# Patient Record
Sex: MALE | Race: BLACK OR AFRICAN AMERICAN | NOT HISPANIC OR LATINO | Employment: UNEMPLOYED | URBAN - METROPOLITAN AREA
[De-identification: names, ages, dates, MRNs, and addresses within clinical notes are randomized per-mention and may not be internally consistent; named-entity substitution may affect disease eponyms.]

---

## 2020-04-09 ENCOUNTER — NURSE TRIAGE (OUTPATIENT)
Dept: ADMINISTRATIVE | Facility: CLINIC | Age: 45
End: 2020-04-09

## 2020-04-09 NOTE — TELEPHONE ENCOUNTER
Patient contacted covid-19 hotline for concern of covid-19 exposure.  Patient's spouse was confirmed covid-19 + yesterday.  Patient's spouse is a nurse.  Patient c/o fatigue, headaches, sore throat and joint pain.  Patient states that fever is not present any more. Disposition: Call Telemedicine Provider. Anywhere care instructions given.    Reason for Disposition   [1] Fever (or feeling feverish) OR cough AND [2] within 14 Days of COVID-19 EXPOSURE (Close Contact)    Additional Information   Negative: SEVERE difficulty breathing (e.g., struggling for each breath, speak in single words, bluish lips)   Negative: Sounds like a life-threatening emergency to the triager   Negative: [1] Adult has symptoms of COVID-19 (fever, cough, or SOB) AND [2] lab test positive   Negative: [1] Adult has symptoms of COVID-19 (fever, cough or SOB) AND [2] major community spread where patient lives AND [3] testing not being done for mild symptoms   Negative: [1] Difficulty breathing (shortness of breath) occurs AND [2] onset > 14 days after COVID-19 EXPOSURE (Close Contact) AND [3] no major community spread   Negative: [1] Cough occurs AND [2] onset > 14 days after COVID-19 EXPOSURE AND [3] no major community spread   Negative: [1] Common cold symptoms AND [2] onset > 14 days after COVID-19 EXPOSURE AND [3] no major community spread   Negative: Patient sounds very sick or weak to the triager   Negative: [1] Difficulty breathing occurs AND [2] within 14 days of COVID-19 EXPOSURE (Close Contact)    Protocols used: CORONAVIRUS (COVID-19) EXPOSURE-A-[place]

## 2020-12-04 ENCOUNTER — CLINICAL SUPPORT (OUTPATIENT)
Dept: REHABILITATION | Facility: HOSPITAL | Age: 45
End: 2020-12-04
Payer: MEDICAID

## 2020-12-04 DIAGNOSIS — R26.89 FUNCTIONAL GAIT ABNORMALITY: ICD-10-CM

## 2020-12-04 DIAGNOSIS — M25.651 DECREASED RANGE OF MOTION OF BOTH HIPS: ICD-10-CM

## 2020-12-04 DIAGNOSIS — R29.898 WEAKNESS OF BOTH HIPS: ICD-10-CM

## 2020-12-04 DIAGNOSIS — M25.652 DECREASED RANGE OF MOTION OF BOTH HIPS: ICD-10-CM

## 2020-12-04 DIAGNOSIS — M25.552 LEFT HIP PAIN: Primary | ICD-10-CM

## 2020-12-04 PROCEDURE — 97161 PT EVAL LOW COMPLEX 20 MIN: CPT | Mod: PN

## 2020-12-04 PROCEDURE — 97110 THERAPEUTIC EXERCISES: CPT | Mod: PN

## 2020-12-07 ENCOUNTER — CLINICAL SUPPORT (OUTPATIENT)
Dept: REHABILITATION | Facility: HOSPITAL | Age: 45
End: 2020-12-07
Payer: MEDICAID

## 2020-12-07 DIAGNOSIS — R29.898 WEAKNESS OF BOTH HIPS: ICD-10-CM

## 2020-12-07 DIAGNOSIS — M25.652 DECREASED RANGE OF MOTION OF BOTH HIPS: ICD-10-CM

## 2020-12-07 DIAGNOSIS — M25.651 DECREASED RANGE OF MOTION OF BOTH HIPS: ICD-10-CM

## 2020-12-07 PROBLEM — M25.552 LEFT HIP PAIN: Status: ACTIVE | Noted: 2020-12-07

## 2020-12-07 PROBLEM — R26.89 FUNCTIONAL GAIT ABNORMALITY: Status: ACTIVE | Noted: 2020-12-07

## 2020-12-07 PROCEDURE — 97110 THERAPEUTIC EXERCISES: CPT | Mod: PN

## 2020-12-07 NOTE — PROGRESS NOTES
"  Physical Therapy Treatment Note     Name: Enid Beauchamp [facility] Number: [id number]    Therapy Diagnosis:   Encounter Diagnoses   Name Primary?    Decreased range of motion of both hips     Weakness of both hips      Physician: Kenneth Melendez MD    Visit Date: 12/7/2020    Physician Orders: PT Eval and Treat   Medical Diagnosis from Referral: M25.552 (ICD-10-CM) - Left hip pain  Evaluation Date: 12/4/2020  Authorization Period Expiration: 2/4/2021  Plan of Care Expiration: 3/4/2021  Visit # / Visits authorized: 2/ 12    Time In: 1335  Time Out: 1420  Total Billable Time: 45 minutes    Precautions: Standard    Subjective     Pt reports: No significant change in symptoms since previous session. Has used HEP stretches with good results.     He was compliant with home exercise program.  Response to previous treatment: No adverse reactions noted   Functional change: Ongoing     Pain: 0/10  Location: left hip       Objective     ENID received therapeutic exercises to develop strength, endurance, flexibility and posture for 45 minutes including:    Upright bike Lvl 1 x3' Lvl 2 x3'  Lateral steps RTB 10x10' laps   Standing HS stretch 3x30" ea   SLR 2x10 ea   SL ABD 2x10 ea   Prone ext - knee straight, knee bent 2x10 ea   SLS star steps RTB 3-way 2x5 ea     NV Consider adding: Quadruped rocking, Stool rotations IR/ER, BKFO, clams, fire hydrants, squats, bridges, planks, bird dogs, pallof press, step ups, lateral step downs, lunges,       Home Exercises Provided and Patient Education Provided     Education provided:   - Rationale for POC and progression   - Updated HEP, intended use, proper form     Written Home Exercises Provided: yes.  Exercises were reviewed and ENID was able to demonstrate them prior to the end of the session.  ENID demonstrated good  understanding of the education provided.     See EMR under Patient Instructions for exercises provided 12/7/2020.    Assessment     Enid was seen today for first " follow up since evaluation. He performed well, tolerating all new hip exercises without exacerbation of symptoms. HEP updated to include hip strengthening circuit. Good form demonstrated with all exercises.   Adequate level of fatigue achieved at conclusion of session.     CATINA is progressing well towards his goals.   Pt prognosis is Good.     Pt will continue to benefit from skilled outpatient physical therapy to address the deficits listed in the problem list box on initial evaluation, provide pt/family education and to maximize pt's level of independence in the home and community environment.     Pt's spiritual, cultural and educational needs considered and pt agreeable to plan of care and goals.     Anticipated barriers to physical therapy: None     Goals:   1. Pt to improve hip strength to 5/5 for improved ability to exercise and play with son. -progressing   2. Pt to improve hip range of motion by >/= 3 degrees to allow for improved mobility while exercising. -progressing   3. Pt to demonstrate understanding of pathology and use of HEP for improved self-management of symptoms. -progressing   4. Pt to report decreased pain to 5/10 at worst for increased participation in social/community activities. -progressing   5. Pt to increased FOTO performance to 37% limitation for improved participation. -progressing   6. Pt to sleep 6+ hours 4/7 nights for decreased fatigue and improved participation in daily activities.-progressing      Long Term Goals (10 Weeks):      1. Pt to demonstrate (-) SLS testing bilaterally and (-) bridge testing, indicating improvement in functional glute strength.-progressing   2. Pt to improve hip IR range of motion to by >/= 10 degrees to allow for improved mobility while exercising.  -progressing   3. Pt to demonstrate proficiency with HEP for improved independence with self-management of condition/symptoms and prevention of re-injury. -progressing   4. Pt to report decreased pain to 3/10  at worst for increased participation in social/community activities. -progressing   5. Pt to increased FOTO performance to 28% limitation for improved participation. -progressing   6. Pt to sleep 6+ hours 6/7 nights in bed for decreased fatigue and improved participation in daily activities.-progressing     Plan     Cont with POC, progressing with flexibility training and hip strengthening and stabilization as tolerated.     NV Consider adding: Quadruped rocking, Stool rotations IR/ER, BKFO, clams, fire hydrants, squats, bridges, planks, bird dogs, pallof press, step ups, lateral step downs, lunges,     NADER REGAN, PT   12/7/2020

## 2020-12-07 NOTE — PLAN OF CARE
[facility]  Physical Therapy Initial Evaluation    Date: 12/4/2020   Name: Enid Barreto  Clinic Number: [id number]    Therapy Diagnosis:   Encounter Diagnoses   Name Primary?    Decreased range of motion of both hips     Left hip pain Yes    Weakness of both hips     Functional gait abnormality      Physician: Kneneth Melendez MD    Physician Orders: PT Eval and Treat   Medical Diagnosis from Referral: M25.552 (ICD-10-CM) - Left hip pain  Evaluation Date: 12/4/2020  Authorization Period Expiration: 2/4/2021  Plan of Care Expiration: 3/4/2021  Visit # / Visits authorized: 1/ 12    Time In: 1200  Time Out: 1250  Total Appointment Time (timed & untimed codes): 50 minutes    Precautions: Standard    Subjective   Date of onset:  April 2020    History of current condition - ENID reports: In April, he began noticing slight pain and limited ROM at L hip while exercising. ROM most limited with external rotation. Pain began worsening and started waking hip from sleep when he changes position. Pain remains at anterior hip, though he has also experienced occasional groin pain on same side. He has been unable to identify a specific TALHA, though he believes it could have started when he was playing catch with his son. He frequently goes up onto toes or leaps to catch a stray ball and lands on rotated hip. He still does this now, and experiences significant pain upon impact. He has not received any treatment for this pain until now; no imaging performed. He would like to find out and fix whatever is going on before it worsens.      Medical History:   Past Medical History:   Diagnosis Date    Hypertension        Surgical History:   Enid Barreto  has no past surgical history on file.    Medications:   Enid has a current medication list which includes the following prescription(s): naproxen.    Allergies:   Review of patient's allergies indicates:  No Known Allergies     Imaging,  none    Prior Therapy: No   Social History:  lives with their family  Occupation: Not currently working   Prior Level of Function: Independent, excising at gym/park regularly  Current Level of Function: Wakes from sleeping, unable to exercise fully due to pain, unable to play with son without increased pain     Pain:  Current 0/10, worst 8/10, best 0/10   Location: left hip    Description: Grabbing, Tight and Sharp  Aggravating Factors: Walking, Getting out of bed/chair and pivoting, changes positioning in sitting/laying down  Easing Factors: nothing    Patients goals: return to normal activity without fear of movement     Objective     Observation: alert and agreeable, L trendelenberg     Posture: PPT, hypolordosis, rounded shoulders, forward head     Hip Range of Motion:   Right active Right Passive Left active  Left Passive *    Flexion 123 130 118 124   Abduction 40 42 35 38   Extension NP  NP NP  NP    Ext. Rotation 38 40 38 40   Int. Rotation 22 23 19 20       Lower Extremity Strength  Right LE  Left LE    Knee extension: 5/5 Knee extension: 5/5   Knee flexion: 5/5 Knee flexion: 5/5   Hip flexion: 5/5 Hip flexion: 5/5   Hip Internal Rotation:  5/5    Hip Internal Rotation: 4+/5      Hip External Rotation: 5/5    Hip External Rotation: 4+/5      Hip extension:  5/5 Hip extension: 5/5   Hip abduction: 5/5 Hip abduction: 5/5   Hip adduction: 4+/5 Hip adduction 4+/5   Ankle dorsiflexion: 5/5 Ankle dorsiflexion: 5/5   Ankle plantarflexion: 5/5 Ankle plantarflexion: 5/5       Special Tests:  Bridge Test: Instability noted   MARK: (+) on L   FADIR: (-)   Scour: (+)  SLR: (-)     Flexibility:    Ely's test: R = WNL  ; L = minor limitation    Popliteal Angle: R = moderate limitation ; L = moderate limitation   Olivier's test: R = WNL ; L = minor limitation    Gilbert test: R = WNL ; L = minor limiation    Joint Mobility: Pain and hypomobility with posterior glide on L     Palpation: TTP at GT with hip flexion      Sensation: No sensory changes noted     Edema: None noted       Limitation/Restriction for FOTO Hip  Survey    Therapist reviewed FOTO scores for Enid Barreto on 12/4/2020.   FOTO documents entered into [employer] - see Media section.    Limitation Score: 46%         TREATMENT   Treatment Time In: 1230  Treatment Time Out: 1250  Total Treatment time (time-based codes) separate from Evaluation: 20 minutes    ENID received therapeutic exercises to develop flexibility, posture for 15 minutes including:    B piriformis stretch   L hip flexor stretch on step   B HS stretch   Standing L ITB stretch     ENID received the following manual therapy techniques: Joint mobilizations were applied to the: L hip  for 5 minutes, including:    Posterior glide G I/II for pain managemen  Home Exercises and Patient Education Provided    Education provided:   - role of PT, plan of care, involved anatomy and pathology,  goals, rational for treatment and exercise, scheduling limitations  - HEP, intended use, proper form     Written Home Exercises Provided: yes.  Exercises were reviewed and ENID was able to demonstrate them prior to the end of the session.  ENID demonstrated good  understanding of the education provided.     See EMR under Patient Instructions for exercises provided 12/4/2020.    Assessment   Enid is a 45 y.o. male referred to outpatient Physical Therapy with a medical diagnosis of L hip pain. Patient presents with anterior hip pain, provoked by hip flexion, ER, MARK, and Scour. Pain not noted with FADIR initially, but provoked with repeated motions. Tenderness also noted at GT with palpation, though Enid denies posterior pain. No gross LE strength deficits noted; functional testing revealed glute instability and B trendelenberg, though worse on L. Slight limitations also noted in B hip ROM, especially with IR. Limited flexibility noted at B hamstrings and piriformis, as well as L ITB and hip flexors. Due to  presentation and TALHA, hip impingement and labral involvement possible. Neid would benefit from strengthening of surrounding musculature for improved stability as well as flexibility training and postural re-education for improved alignment and mobility.     Patient prognosis is Good.   Patientt will benefit from skilled outpatient Physical Therapy to address the deficits stated above and in the chart below, provide patient /family education, and to maximize patientt's level of independence.     Plan of care discussed with patient: Yes  Patient's spiritual, cultural and educational needs considered and patient is agreeable to the plan of care and goals as stated below:     Anticipated Barriers for therapy: None     Medical Necessity is demonstrated by the following  History  Co-morbidities and personal factors that may impact the plan of care Co-morbidities:   HTN    Personal Factors:   no deficits     low   Examination  Body Structures and Functions, activity limitations and participation restrictions that may impact the plan of care Body Regions:   lower extremities    Body Systems:    gross symmetry  ROM  strength  gross coordinated movement  gait  transfers  transitions  motor control  motor learning    Participation Restrictions:   Household activities, exercise, playing with son    Activity limitations:   Learning and applying knowledge  no deficits    General Tasks and Commands  no deficits    Communication  no deficits    Mobility  lifting and carrying objects  walking    Self care  no deficits    Domestic Life  doing house work (cleaning house, washing dishes, laundry)  assisting others    Interactions/Relationships  no deficits    Life Areas  no deficits    Community and Social Life  community life  recreation and leisure         low   Clinical Presentation evolving clinical presentation with changing clinical characteristics moderate   Decision Making/ Complexity Score: low     Goals:  Short Term Goals (5  Weeks):     1. Pt to improve hip strength to 5/5 for improved ability to exercise and play with son.  2. Pt to improve hip range of motion by >/= 3 degrees to allow for improved mobility while exercising.   3. Pt to demonstrate understanding of pathology and use of HEP for improved self-management of symptoms.   4. Pt to report decreased pain to 5/10 at worst for increased participation in social/community activities.   5. Pt to increased FOTO performance to 37% limitation for improved participation.   6. Pt to sleep 6+ hours 4/7 nights for decreased fatigue and improved participation in daily activities.    Long Term Goals (10 Weeks):     1. Pt to demonstrate (-) SLS testing bilaterally and (-) bridge testing, indicating improvement in functional glute strength.  2. Pt to improve hip IR range of motion to by >/= 10 degrees to allow for improved mobility while exercising.    3. Pt to demonstrate proficiency with HEP for improved independence with self-management of condition/symptoms and prevention of re-injury.   4. Pt to report decreased pain to 3/10 at worst for increased participation in social/community activities.   5. Pt to increased FOTO performance to 28% limitation for improved participation.   6. Pt to sleep 6+ hours 6/7 nights in bed for decreased fatigue and improved participation in daily activities.      Plan   Plan of care Certification: 12/4/2020 to 3/4/2021.    Outpatient Physical Therapy 2 times weekly for 10 weeks to include the following interventions: Gait Training, Manual Therapy, Moist Heat/ Ice, Neuromuscular Re-ed, Patient Education, Self Care, Therapeutic Activites and Therapeutic Exercise.     NADER REGAN, PT   12/4/2020

## 2021-06-09 ENCOUNTER — CLINICAL SUPPORT (OUTPATIENT)
Dept: REHABILITATION | Facility: HOSPITAL | Age: 46
End: 2021-06-09
Payer: MEDICAID

## 2021-06-09 DIAGNOSIS — M25.652 DECREASED RANGE OF MOTION OF BOTH HIPS: Primary | ICD-10-CM

## 2021-06-09 DIAGNOSIS — R29.898 WEAKNESS OF BOTH HIPS: ICD-10-CM

## 2021-06-09 DIAGNOSIS — R26.89 FUNCTIONAL GAIT ABNORMALITY: ICD-10-CM

## 2021-06-09 DIAGNOSIS — M25.552 LEFT HIP PAIN: ICD-10-CM

## 2021-06-09 DIAGNOSIS — M25.651 DECREASED RANGE OF MOTION OF BOTH HIPS: Primary | ICD-10-CM

## 2021-06-09 DIAGNOSIS — M25.69 DECREASED RANGE OF MOTION OF TRUNK AND BACK: ICD-10-CM

## 2021-06-09 PROCEDURE — 97110 THERAPEUTIC EXERCISES: CPT | Mod: PN

## 2021-06-09 PROCEDURE — 97161 PT EVAL LOW COMPLEX 20 MIN: CPT | Mod: PN

## 2021-06-16 ENCOUNTER — CLINICAL SUPPORT (OUTPATIENT)
Dept: REHABILITATION | Facility: HOSPITAL | Age: 46
End: 2021-06-16
Payer: MEDICAID

## 2021-06-16 DIAGNOSIS — M25.69 DECREASED RANGE OF MOTION OF TRUNK AND BACK: ICD-10-CM

## 2021-06-16 PROCEDURE — 97110 THERAPEUTIC EXERCISES: CPT | Mod: PN

## 2021-06-23 ENCOUNTER — CLINICAL SUPPORT (OUTPATIENT)
Dept: REHABILITATION | Facility: HOSPITAL | Age: 46
End: 2021-06-23
Payer: MEDICAID

## 2021-06-23 DIAGNOSIS — M25.69 DECREASED RANGE OF MOTION OF TRUNK AND BACK: ICD-10-CM

## 2021-06-23 PROCEDURE — 97110 THERAPEUTIC EXERCISES: CPT | Mod: PN,CQ

## 2021-07-02 ENCOUNTER — CLINICAL SUPPORT (OUTPATIENT)
Dept: REHABILITATION | Facility: HOSPITAL | Age: 46
End: 2021-07-02
Payer: MEDICAID

## 2021-07-02 DIAGNOSIS — M25.69 DECREASED RANGE OF MOTION OF TRUNK AND BACK: ICD-10-CM

## 2021-07-02 PROCEDURE — 97110 THERAPEUTIC EXERCISES: CPT | Mod: PN

## 2021-11-29 DIAGNOSIS — M76.61 TENDONITIS, ACHILLES, RIGHT: Primary | ICD-10-CM

## 2022-01-04 ENCOUNTER — CLINICAL SUPPORT (OUTPATIENT)
Dept: REHABILITATION | Facility: HOSPITAL | Age: 47
End: 2022-01-04
Payer: MEDICAID

## 2022-01-04 DIAGNOSIS — R29.898 DECREASED STRENGTH OF LOWER EXTREMITY: ICD-10-CM

## 2022-01-04 DIAGNOSIS — M25.674 DECREASED ROM OF RIGHT FOOT: ICD-10-CM

## 2022-01-04 DIAGNOSIS — R26.89 GAIT, ANTALGIC: ICD-10-CM

## 2022-01-04 PROCEDURE — 97161 PT EVAL LOW COMPLEX 20 MIN: CPT | Mod: PN

## 2022-01-04 PROCEDURE — 97110 THERAPEUTIC EXERCISES: CPT | Mod: PN

## 2022-01-04 NOTE — PROGRESS NOTES
Physical Therapy Initial Evaluation     Name: Enid Barreto  Clinic Number: [id number]    Therapy Diagnosis:   Encounter Diagnoses   Name Primary?    Decreased ROM of right foot     Gait, antalgic     Decreased strength of lower extremity      Physician: Kenneth Melendez MD    Physician Orders: PT Eval and Treat   Medical Diagnosis from Referral: M76.61 (ICD-10-CM) - Tendonitis, Achilles, right  Evaluation Date: 1/4/2022  Authorization Period Expiration: 11/29/2022  Plan of Care Expiration: 4/4/22  Visit # / Visits authorized: 1/ 1    Time In: 1120a  Time Out: 1207  Total Billable Time: 47 minutes    Precautions: Standard and HTN    Subjective     Medical History:   Past Medical History:   Diagnosis Date    Hypertension        Surgical History:   Enid Barreto  has no past surgical history on file.    Medications:   Enid has a current medication list which includes the following prescription(s): naproxen.    Allergies:   Review of patient's allergies indicates:  No Known Allergies     Date of onset: August 2021  History of current condition - ENID reports: The foot pain has been going on for a little bit. It has been going on and off with moderate intermittent pain. Around August/October it started to get worse. It will be stiff in the morning and then at night it would start to throb. He told his doctor that he wants to be able to keep his mobility and wants to try and stop any threat that he can and he suggested started with the therapy. Pt reported he does not really like taking medication. MD told him that the pain may be some tendinitis. He reports that pain is worse at the sides of the achilles.     Imaging, none:     Prior Therapy: PT for hip and back  Social History: SS home lives with their family (6 y.o. son)  Occupation: retired  Prior Level of Function: independent  DME owned/used: none  Current Level of Function: difficulty walking,  running    Pain:  Current 0/10, worst 8/10, best 0/10   Location: right ankles  Description: Aching, Dull and Sharp  Aggravating Factors: walking, steps, walking on his toes, jumping  Easing Factors: rest    Pts goals: decrease pain, get his mobility back    Objective     Observation: bilateral pes planus, antalgic gait    Range of Motion: Ankle    Left Right   Dorsiflexion: 10 8   Plantarflexion 40 50   Inversion 30 30   Eversion 20 30     Strength: Ankle    Left Right   Gastrocnemius 5/5 5/5   Soleus 5/5 5/5   Dorsiflexion 5/5 5/5   Inversion 5/5 5/5   Eversion 5/5 5/5     Strength: Knee   Left Right   Quadriceps 5/5 5/5   Hamstrings 5/5 5/5       Strength: Hip    Left Right   Iliopsoas 5/5 5/5   Glute Med 5/5 4+/5   IR 4/5 pain in hip 4/5 pain in hip   ER 4+/5 4/5   Ext 4+/5 4+/5     Special Tests   Left Right   Anterior Drawer - -   Posterior Drawer - -   Talar Tilt - -   Kleiger's Test - -   Navicular Drop - -   Squeeze Test - -   Bump Test - -     Joint Mobility: hypomobility of right ankle  Palpation: TTP at achilles insertion (lateral>medial)  Sensation: intact to light touch    Edema:  Left: absent  Right: absent    Gait: [place] ambulated 100 feet with none.  Level of Assistance: independent  Patient displays antalgic gait.   Balance:NT    CMS Impairment/Limitation/Restriction for FOTO Ankle Survey    Therapist reviewed FOTO scores for Enid Barreto on 1/4/2022.   FOTO documents entered into [employer] - see Media section.    Limitation Score: Not administered by staff       TREATMENT     Treatment Time In: 1245  Treatment Time Out: 1307  Total Treatment time separate from Evaluation: 22 minutes    ENID received the following manual therapy techniques: Soft tissue Mobilization were applied to the:  for 22 minutes, including:    Pt cleared of contraindications and verbal and written consent acquired. Pt given option of copy of consent form. Pt educated on benefits and potential side effects of DN. DOM  performed to achilles tendon (5 needles total) and right gastroc (6 neeldes total) . FDN performed to reduce pain and muscle tension, promote blood flow, and improve ROM and function x 22 minutes. Pt tolerated tx well without adverse effects. Pt was educated on what to expect following the procedure and verbalized understanding.       NV: bike, slantboard stretch, eccentric heel raises, SLS, tandem stance, hip and glute strengthening    Home Exercises and Patient Education Provided    Education provided:   - HEP  - FDN rationale    Written Home Exercises Provided: yes.  Exercises were reviewed and ENID was able to demonstrate them prior to the end of the session.  ENID demonstrated good  understanding of the education provided.     See EMR under Patient Instructions for exercises provided 1/4/2022.    ASSESSMENT     Enid is a 46 y.o. male referred to outpatient Physical Therapy with a medical diagnosis of Tendonitis, Achilles, right. with signs and symptoms including:: increased achilles pain, decreased LE Strength, soft tissue dysfunction, postural imbalance,impaired joint mobility, and decreased tolerance to functional activities. PT able to reproduce pt symptoms with palpation of medial and lateral boards of achilles insertion with lateral side being more symptomatic. Pt currently reports most limitation and pain with running and jumping.   Pt with good motivation to perform physical activity and responds well to cueing.  Pt prognosis is Good.   Pt will benefit from skilled outpatient Physical Therapy to address the deficits stated above and in the chart below, provide pt/family education, and to maximize pt's level of independence.     Plan of care discussed with patient: Yes  Pt's spiritual, cultural and educational needs considered and patient is agreeable to the plan of care and goals as stated below:     Anticipated Barriers for therapy: Covid-19 concerns      Medical Necessity is demonstrated by the  "following  History  Co-morbidities and personal factors that may impact the plan of care Co-morbidities:   advanced age and HTN    Personal Factors:   age  coping style  social background  lifestyle  character     moderate   Examination  Body Structures and Functions, activity limitations and participation restrictions that may impact the plan of care Body Regions:   lower extremities    Body Systems:    balance  gait  transfers  transitions  motor control  motor learning    Participation Restrictions:   Walking, jumping, running    Activity limitations:   Learning and applying knowledge  no deficits    General Tasks and Commands  no deficits    Communication  no deficits    Mobility  lifting and carrying objects  walking    Self care  no deficits    Domestic Life  shopping  cooking  doing house work (cleaning house, washing dishes, laundry)    Interactions/Relationships  No deficits    Life Areas  No deficits    Community and Social Life  No deficits         moderate   Clinical Presentation stable and uncomplicated low   Decision Making/ Complexity Score: low     Pt's spiritual, cultural and educational needs considered and pt agreeable to plan of care and goals as stated below:       Short Term GOALS: 5 weeks. Pt agrees with goals set.  1. Patient demonstrates independence with HEP.   2. Patient demonstrates independence with Postural Awareness.   3. Patient demonstrates independence with body mechanics.   4.Patient demonstrates ability to walk 1 mile or greater without increase in pain to improve tolerance to functional tasks    Long Term GOALS: 10 weeks. Pt agrees with goals set.  1. Patient demonstrates ability to perform 10 box jumps on 18" plyo box with proper mechanics to allow pt to return to gym routine  2. Patient demonstrates increased hip strength BLE's to 5/5 or greater to improve tolerance to functional activities pain free.   3. PT to administer FOTO and determine appropriate goal   4. Patient " demonstrates ability to run 1/3 mile with no increase in pain    PLAN     Plan of care Certification: 1/4/2022 to 4/4/22.    Outpatient Physical Therapy 2 times weekly for 10 weeks to include the following interventions: Gait Training, Manual Therapy, Moist Heat/ Ice, Neuromuscular Re-ed, Patient Education, Therapeutic Activities, Therapeutic Exercise and FDN.  Pt may be seen by PTA as part of the rehabilitation team.     Sulma Ferguson, PT,DPT  1/4/2022    I have seen the patient, reviewed the therapist's plan of care, and I agree with the plan of care.      I certify the need for these services furnished under this plan of treatment and while under my care.     ___________________ ________ Physician/Referring Practitioner            ___________________________ Date of Signature

## 2022-01-12 ENCOUNTER — CLINICAL SUPPORT (OUTPATIENT)
Dept: REHABILITATION | Facility: HOSPITAL | Age: 47
End: 2022-01-12
Payer: MEDICAID

## 2022-01-12 DIAGNOSIS — R29.898 DECREASED STRENGTH OF LOWER EXTREMITY: ICD-10-CM

## 2022-01-12 DIAGNOSIS — M25.674 DECREASED ROM OF RIGHT FOOT: Primary | ICD-10-CM

## 2022-01-12 DIAGNOSIS — R26.89 GAIT, ANTALGIC: ICD-10-CM

## 2022-01-12 PROCEDURE — 97110 THERAPEUTIC EXERCISES: CPT | Mod: PN,CQ

## 2022-01-12 NOTE — PROGRESS NOTES
Physical Therapy Daily Treatment Note     Name: Enid Beauchamp [facility] Number: [id number]    Therapy Diagnosis:   Encounter Diagnoses   Name Primary?    Decreased ROM of right foot Yes    Gait, antalgic     Decreased strength of lower extremity      Physician: Kenneth Melendez MD    Visit Date: 1/12/2022    Physician Orders: PT Eval and Treat   Medical Diagnosis from Referral: M76.61 (ICD-10-CM) - Tendonitis, Achilles, right  Evaluation Date: 1/4/2022  Authorization Period Expiration: 11/29/2022  Plan of Care Expiration: 4/4/22  Visit # / Visits authorized: 1/ 20     Time In: 1210  Time Out: 1250  Total Billable Time: 40 minutes     Precautions: Standard and HTN      Subjective     Pt reports: he is hurting a little bit. When to the gym this morning, did elliptical and upper body work.  He was compliant with home exercise program.  Response to previous treatment: 1st treat  Functional change: ongoing    Pain: 5/10  Location: right achilles     Objective     ENID received the following manual therapy techniques: Soft tissue Mobilization were applied to the:  for 0 minutes, including:     Pt cleared of contraindications and verbal and written consent acquired. Pt given option of copy of consent form. Pt educated on benefits and potential side effects of DN. FDN performed to achilles tendon (5 needles total) and right gastroc (6 neeldes total) . FDN performed to reduce pain and muscle tension, promote blood flow, and improve ROM and function x 22 minutes. Pt tolerated tx well without adverse effects. Pt was educated on what to expect following the procedure and verbalized understanding.      Enid participated in therex including strengthening and stretch for 40 minutes:    Bike 5 mins  slantboard stretch  eccentric heel raises shuttle 2x12 2 cords   Single leg squat airex on shuttle 2.5 cords 2x12  SLS airex 3x30 secs  tandem stance 2x30 secs each  Standing hip abduction RTB + airex x20 each  Standing hip  extension RTB + airex x20 each  Heel raises x20  Sled puch 25# x2laps        CATINA participated in neuromuscular re-education activities to improve:  for  minutes. The following activities were included:        CATINA received hot pack for  minutes to .    CATINA received cold pack for  minutes to .      Home Exercises Provided and Patient Education Provided     Education provided:   - Cont HEP as prescribed by PT    Written Home Exercises Provided: Patient instructed to cont prior HEP.  Exercises were reviewed and CATINA was able to demonstrate them prior to the end of the session.  CATINA demonstrated good  understanding of the education provided.     See EMR under Patient Instructions for exercises provided prior visit.    Assessment     Pt presents to clinic for first follow up visit. Pt reports no pain, but feels twinge sometimes in the ankle with no cause. Pt had good tolerance to all therex. Appropriate level of fatigue present with hip exercises. Increased sway noted with RLE posterior in tandem stance.    CATINA Is progressing well towards his goals.   Pt prognosis is Good.     Pt will continue to benefit from skilled outpatient physical therapy to address the deficits listed in the problem list box on initial evaluation, provide pt/family education and to maximize pt's level of independence in the home and community environment.     Pt's spiritual, cultural and educational needs considered and pt agreeable to plan of care and goals.     Anticipated barriers to physical therapy: covid    Short Term GOALS: 5 weeks. Pt agrees with goals set.  1. Patient demonstrates independence with HEP.   2. Patient demonstrates independence with Postural Awareness.   3. Patient demonstrates independence with body mechanics.   4.Patient demonstrates ability to walk 1 mile or greater without increase in pain to improve tolerance to functional tasks     Long Term GOALS: 10 weeks. Pt agrees with goals set.  1. Patient demonstrates  "ability to perform 10 box jumps on 18" plyo box with proper mechanics to allow pt to return to gym routine  2. Patient demonstrates increased hip strength BLE's to 5/5 or greater to improve tolerance to functional activities pain free.   3. PT to administer FOTO and determine appropriate goal   4. Patient demonstrates ability to run 1/3 mile with no increase in pain      Plan     Plan of care Certification: 1/4/2022 to 4/4/22.     Outpatient Physical Therapy 2 times weekly for 10 weeks to include the following interventions: Gait Training, Manual Therapy, Moist Heat/ Ice, Neuromuscular Re-ed, Patient Education, Therapeutic Activities, Therapeutic Exercise and FDN      Petr Benoit PTA       " No

## 2022-02-01 NOTE — PROGRESS NOTES
Physical Therapy Daily Treatment Note     Name: Enid Beauchamp [facility] Number: [id number]    Therapy Diagnosis:   Encounter Diagnoses   Name Primary?    Decreased ROM of right foot Yes    Gait, antalgic     Decreased strength of lower extremity      Physician: Kenneth Melendez MD    Visit Date: 2/2/2022    Physician Orders: PT Eval and Treat   Medical Diagnosis from Referral: M76.61 (ICD-10-CM) - Tendonitis, Achilles, right  Evaluation Date: 1/4/2022  Authorization Period Expiration: 11/29/2022  Plan of Care Expiration: 4/4/22  Visit # / Visits authorized: 2/ 20     Time In: 1030  Time Out: 1115  Total Billable Time: 45 minutes     Precautions: Standard and HTN      Subjective     Pt reports: he is doing well. Hurts some with increased activity  He was compliant with home exercise program.  Response to previous treatment: 1st treat  Functional change: ongoing    Pain: 5/10  Location: right achilles     Objective     ENID received the following manual therapy techniques: Soft tissue Mobilization were applied to the:  for 0 minutes, including:     Pt cleared of contraindications and verbal and written consent acquired. Pt given option of copy of consent form. Pt educated on benefits and potential side effects of DN. FDN performed to achilles tendon (5 needles total) and right gastroc (6 neeldes total) . FDN performed to reduce pain and muscle tension, promote blood flow, and improve ROM and function x 22 minutes. Pt tolerated tx well without adverse effects. Pt was educated on what to expect following the procedure and verbalized understanding.      Enid participated in therex including strengthening and stretch for 40 minutes:    Bike 5 mins  slantboard stretch  +soleus stretch [id number]  eccentric heel raises shuttle 2x12 2 cords   Single leg squat airex on shuttle 2.5 cords 2x12  SLS airex 3x30 secs  tandem stance 2x30 secs each  +Side stepping GTB x2laps   +monster walks GTB x2laps   +[place] pickup  x2  +towel scrunch x2mins  +tennis ball roll x2mins    Standing hip abduction RTB + airex x20 each  Standing hip extension RTB + airex x20 each  Heel raises x20  Sled puch 25# x2laps        CATINA participated in neuromuscular re-education activities to improve:  for  minutes. The following activities were included:        CATINA received hot pack for  minutes to .    CATINA received cold pack for  minutes to .      Home Exercises Provided and Patient Education Provided     Education provided:   - Cont HEP as prescribed by PT    Written Home Exercises Provided: Patient instructed to cont prior HEP.  Exercises were reviewed and CATINA was able to demonstrate them prior to the end of the session.  CATINA demonstrated good  understanding of the education provided.     See EMR under Patient Instructions for exercises provided prior visit.    Assessment     Pt presents to clinic for follow up visit. Pt reports he feels increased pain / discomfort with increased activity. Pt tolerated session well today with no increase in pain. Pt presented with fatigue with isolated calf exercises and intrinsic foot exercises. Increased hip strengthening adding dynamic movements, verbal cuing for correct technique      CATINA Is progressing well towards his goals.   Pt prognosis is Good.     Pt will continue to benefit from skilled outpatient physical therapy to address the deficits listed in the problem list box on initial evaluation, provide pt/family education and to maximize pt's level of independence in the home and community environment.     Pt's spiritual, cultural and educational needs considered and pt agreeable to plan of care and goals.     Anticipated barriers to physical therapy: covid    Short Term GOALS: 5 weeks. Pt agrees with goals set.  1. Patient demonstrates independence with HEP.   2. Patient demonstrates independence with Postural Awareness.   3. Patient demonstrates independence with body mechanics.   4.Patient demonstrates  "ability to walk 1 mile or greater without increase in pain to improve tolerance to functional tasks     Long Term GOALS: 10 weeks. Pt agrees with goals set.  1. Patient demonstrates ability to perform 10 box jumps on 18" plyo box with proper mechanics to allow pt to return to gym routine  2. Patient demonstrates increased hip strength BLE's to 5/5 or greater to improve tolerance to functional activities pain free.   3. PT to administer FOTO and determine appropriate goal   4. Patient demonstrates ability to run 1/3 mile with no increase in pain      Plan     Plan of care Certification: 1/4/2022 to 4/4/22.     Outpatient Physical Therapy 2 times weekly for 10 weeks to include the following interventions: Gait Training, Manual Therapy, Moist Heat/ Ice, Neuromuscular Re-ed, Patient Education, Therapeutic Activities, Therapeutic Exercise and FDN      Petr Benoit PTA       "

## 2022-02-02 ENCOUNTER — CLINICAL SUPPORT (OUTPATIENT)
Dept: REHABILITATION | Facility: HOSPITAL | Age: 47
End: 2022-02-02
Payer: MEDICAID

## 2022-02-02 DIAGNOSIS — R29.898 DECREASED STRENGTH OF LOWER EXTREMITY: ICD-10-CM

## 2022-02-02 DIAGNOSIS — M25.674 DECREASED ROM OF RIGHT FOOT: Primary | ICD-10-CM

## 2022-02-02 DIAGNOSIS — R26.89 GAIT, ANTALGIC: ICD-10-CM

## 2022-02-02 PROCEDURE — 97110 THERAPEUTIC EXERCISES: CPT | Mod: PN,CQ

## 2022-02-04 ENCOUNTER — CLINICAL SUPPORT (OUTPATIENT)
Dept: REHABILITATION | Facility: HOSPITAL | Age: 47
End: 2022-02-04
Payer: MEDICAID

## 2022-02-04 DIAGNOSIS — M25.674 DECREASED ROM OF RIGHT FOOT: ICD-10-CM

## 2022-02-04 DIAGNOSIS — R26.89 GAIT, ANTALGIC: ICD-10-CM

## 2022-02-04 DIAGNOSIS — R29.898 DECREASED STRENGTH OF LOWER EXTREMITY: ICD-10-CM

## 2022-02-04 PROCEDURE — 97110 THERAPEUTIC EXERCISES: CPT | Mod: PN

## 2022-02-04 NOTE — PROGRESS NOTES
Physical Therapy Daily Treatment Note     Name: Enid Barreto  Clinic Number: [id number]    Therapy Diagnosis:   Encounter Diagnoses   Name Primary?    Decreased ROM of right foot     Gait, antalgic     Decreased strength of lower extremity      Physician: Kenneth Melendez MD    Visit Date: 2/4/2022    Physician Orders: PT Eval and Treat   Medical Diagnosis from Referral: M76.61 (ICD-10-CM) - Tendonitis, Achilles, right  Evaluation Date: 1/4/2022  Authorization Period Expiration: 11/29/2022  Plan of Care Expiration: 4/4/22  Visit # / Visits authorized: 3/ 20  PN Due: 3/4/22     Time In: 1217p  Time Out: 1300  Total Billable Time: 43 minutes     Precautions: Standard and HTN      Subjective     Pt reports: He had some pain last night just when he was sitting and watching tv. He denies being overly active yesterday to cause the increase in pain. He mostly has pain when he is running and makes a cut the wrong way.   He was compliant with home exercise program.  Response to previous treatment: hip soreness  Functional change: ongoing    Pain: 0/10  Location: right achilles     Objective     CMS Impairment/Limitation/Restriction for FOTO Ankle Survey    Therapist reviewed FOTO scores for Enid Barreto on 2/4/2022.   FOTO documents entered into [employer] - see Media section.    Limitation Score: 31%     ENID received the following manual therapy techniques: Soft tissue Mobilization were applied to the:  for 0 minutes, including:     Pt cleared of contraindications and verbal and written consent acquired. Pt given option of copy of consent form. Pt educated on benefits and potential side effects of DN. FDN performed to achilles tendon (5 needles total) and right gastroc (6 neeldes total) . FDN performed to reduce pain and muscle tension, promote blood flow, and improve ROM and function x 22 minutes. Pt tolerated tx well without adverse effects. Pt was educated on what to expect following the procedure and  "verbalized understanding.      Enid participated in therex including strengthening and stretch for 43 minutes:    Bike 5 mins  slantboard stretch 30"x2  soleus stretch [id number]    eccentric heel raises shuttle 2x12 2.5 cords   Single leg squat airex on shuttle 2.5 cords 2x12    SLS airex 3x30 secs  tandem stance +airex 2x30 secs each  Standing hip abduction +GTB + airex x20 each  Standing hip extension +GTB + airex x20 each    Side stepping GTB x2laps   monster walks GTB x2laps     [place] pickup x2  +[place] x2mins  tennis ball roll x2mins    Heel raises x20  Sled push 25# x2laps        ENID participated in neuromuscular re-education activities to improve:  for  minutes. The following activities were included:        ENID received hot pack for  minutes to .    ENID received cold pack for  minutes to .      Home Exercises Provided and Patient Education Provided     Education provided:   - Cont HEP as prescribed by PT    Written Home Exercises Provided: Patient instructed to cont prior HEP.  Exercises were reviewed and ENID was able to demonstrate them prior to the end of the session.  ENID demonstrated good  understanding of the education provided.     See EMR under Patient Instructions for exercises provided prior visit.    Assessment     Enid was reassessed today and has met 2 STG since his initial evaluation. He continues to report pain in right achilles with certain movement and plyometric tasks. At this time he remains appropriate for therapy and would continue to benefit from skilled PT to address deficits.     Pt tolerated today's treatment session well. He was fatigued and challenged with all hip strengthening. Foot intrinsic strengthening progressed from towel scrunches to [place] with good tolerance by pt. He continues to respond well to treatment plan.     ENID Is progressing well towards his goals.   Pt prognosis is Good.     Pt will continue to benefit from skilled outpatient physical therapy to address the " "deficits listed in the problem list box on initial evaluation, provide pt/family education and to maximize pt's level of independence in the home and community environment.     Pt's spiritual, cultural and educational needs considered and pt agreeable to plan of care and goals.     Anticipated barriers to physical therapy: covid    Short Term GOALS: 5 weeks. Pt agrees with goals set. progressing  1. Patient demonstrates independence with HEP. met  2. Patient demonstrates independence with Postural Awareness.   3. Patient demonstrates independence with body mechanics.   4.Patient demonstrates ability to walk 1 mile or greater without increase in pain to improve tolerance to functional tasks met     Long Term GOALS: 10 weeks. Pt agrees with goals set. progressing  1. Patient demonstrates ability to perform 10 box jumps on 18" plyo box with proper mechanics to allow pt to return to gym routine  2. Patient demonstrates increased hip strength BLE's to 5/5 or greater to improve tolerance to functional activities pain free.   3. Pt has FOTO score of 23% limitation or better  4. Patient demonstrates ability to run 1/3 mile with no increase in pain      Plan     Plan of care Certification: 1/4/2022 to 4/4/22.     Outpatient Physical Therapy 2 times weekly for 10 weeks to include the following interventions: Gait Training, Manual Therapy, Moist Heat/ Ice, Neuromuscular Re-ed, Patient Education, Therapeutic Activities, Therapeutic Exercise and FDN      Sulma Ferguson, PT,DPT  02/04/2022        "

## 2022-02-23 ENCOUNTER — DOCUMENTATION ONLY (OUTPATIENT)
Dept: REHABILITATION | Facility: HOSPITAL | Age: 47
End: 2022-02-23
Payer: MEDICAID

## 2022-02-23 NOTE — PROGRESS NOTES
Pt no showed today's physical therapy treatment session.     Sulma Palmer, PT, DPT  02/23/2022

## 2022-03-07 ENCOUNTER — CLINICAL SUPPORT (OUTPATIENT)
Dept: REHABILITATION | Facility: HOSPITAL | Age: 47
End: 2022-03-07
Payer: MEDICAID

## 2022-03-07 DIAGNOSIS — R29.898 DECREASED STRENGTH OF LOWER EXTREMITY: ICD-10-CM

## 2022-03-07 DIAGNOSIS — R26.89 GAIT, ANTALGIC: ICD-10-CM

## 2022-03-07 DIAGNOSIS — M25.674 DECREASED ROM OF RIGHT FOOT: Primary | ICD-10-CM

## 2022-03-07 PROCEDURE — 97110 THERAPEUTIC EXERCISES: CPT | Mod: PN

## 2022-03-07 NOTE — PROGRESS NOTES
"  Physical Therapy Daily Treatment Note     Name: Enid Beauchamp [place]  Clinic Number: [id number]    Therapy Diagnosis:   Encounter Diagnoses   Name Primary?    Decreased ROM of right foot Yes    Gait, antalgic     Decreased strength of lower extremity      Physician: Kenneth Melendez MD    Visit Date: 3/7/2022    Physician Orders: PT Eval and Treat   Medical Diagnosis from Referral: M76.61 (ICD-10-CM) - Tendonitis, Achilles, right  Evaluation Date: 1/4/2022  Authorization Period Expiration: 11/29/2022  Plan of Care Expiration: 4/4/22  Visit # / Visits authorized: 4/ 20  PN Due: 3/4/22     Time In: 1230  Time Out: 1332  Total Billable Time: 62 minutes     Precautions: Standard and HTN      Subjective     Pt reports: He had some pain this morning when he was sleeping. He reports that he went to the park yesterday with his son and ran a little bit but other than that he does not remember doing anything to cause pain.   He was compliant with home exercise program.  Response to previous treatment: hip soreness  Functional change: ongoing    Pain: 3/10  Location: right achilles     Objective        Enid participated in therex including strengthening and stretch for 62 minutes:    Bike 5 mins  +TM 6' 3.1 mph  slantboard stretch 30"x3  soleus stretch [id number]    eccentric heel raises shuttle 2x10 +3 cords   Single leg squat airex on shuttle +3 cords 2x10  +shuttle eccentric running 30"x3 1 cord    SLS airex 3x30 secs  tandem stance +airex 2x30 secs each  Standing hip abduction +GTB + airex x20 each  Standing hip extension +GTB + airex x20 each    Side stepping GTB @ ankle x2laps   monster walks GTB @ ankles  x2laps +retro      Paresh x3mins  tennis ball roll x2mins  +arch lifts 20x2"    Sled push 25# x2laps    ENID received the following manual therapy techniques: Soft tissue Mobilization were applied to the:  for 15 minutes, including:     STM right gastroc and achilles tendon    ENID participated in neuromuscular " re-education activities to improve:  for  minutes. The following activities were included:        ENID received hot pack for  minutes to .    ENID received cold pack for  minutes to .      Home Exercises Provided and Patient Education Provided     Education provided:   - arch lifts added to HEP  - roll of arch with foot body mechanics and stress on the achilles    Written Home Exercises Provided: Patient instructed to cont prior HEP.  Exercises were reviewed and ENID was able to demonstrate them prior to the end of the session.  ENID demonstrated good  understanding of the education provided.     See EMR under Patient Instructions for exercises provided prior visit.    Assessment     Enid arrived to today's session with mild pain. However, pain increased to 6/10 within second of attempting elliptical and exercise was stopped and pt moved to treadmill. PT noted increase in pronation of BLE (R>L) while ambulating on TM. PT performed STM to right gastroc with hypertonicity noted in lateral head. PT progressed plyometric exercises with shuttle eccentric running with good tolerance by pt. VCs for soft landing with exercises. He was challenged with lateral walks and monster walks. PT added arch lifts for improvements in foot intrinsics and posture with therex. Pt reported appropriate muscle fatigue with exercises.    ENID Is progressing well towards his goals.   Pt prognosis is Good.     Pt will continue to benefit from skilled outpatient physical therapy to address the deficits listed in the problem list box on initial evaluation, provide pt/family education and to maximize pt's level of independence in the home and community environment.     Pt's spiritual, cultural and educational needs considered and pt agreeable to plan of care and goals.     Anticipated barriers to physical therapy: covid    Short Term GOALS: 5 weeks. Pt agrees with goals set. progressing  1. Patient demonstrates independence with HEP. met  2.  "Patient demonstrates independence with Postural Awareness.   3. Patient demonstrates independence with body mechanics.   4.Patient demonstrates ability to walk 1 mile or greater without increase in pain to improve tolerance to functional tasks met     Long Term GOALS: 10 weeks. Pt agrees with goals set. progressing  1. Patient demonstrates ability to perform 10 box jumps on 18" plyo box with proper mechanics to allow pt to return to gym routine  2. Patient demonstrates increased hip strength BLE's to 5/5 or greater to improve tolerance to functional activities pain free.   3. Pt has FOTO score of 23% limitation or better  4. Patient demonstrates ability to run 1/3 mile with no increase in pain      Plan     Plan of care Certification: 1/4/2022 to 4/4/22.     Outpatient Physical Therapy 2 times weekly for 10 weeks to include the following interventions: Gait Training, Manual Therapy, Moist Heat/ Ice, Neuromuscular Re-ed, Patient Education, Therapeutic Activities, Therapeutic Exercise and FDN      Sulma Ferguson, PT,DPT  03/07/2022        "

## 2022-03-11 ENCOUNTER — CLINICAL SUPPORT (OUTPATIENT)
Dept: REHABILITATION | Facility: HOSPITAL | Age: 47
End: 2022-03-11
Payer: MEDICAID

## 2022-03-11 DIAGNOSIS — R26.89 GAIT, ANTALGIC: ICD-10-CM

## 2022-03-11 DIAGNOSIS — R29.898 DECREASED STRENGTH OF LOWER EXTREMITY: ICD-10-CM

## 2022-03-11 DIAGNOSIS — M25.674 DECREASED ROM OF RIGHT FOOT: Primary | ICD-10-CM

## 2022-03-11 PROCEDURE — 97110 THERAPEUTIC EXERCISES: CPT | Mod: PN

## 2022-03-11 NOTE — PROGRESS NOTES
"  Physical Therapy Daily Treatment Note     Name: Enid Barreto  Clinic Number: [id number]    Therapy Diagnosis:   Encounter Diagnoses   Name Primary?    Decreased ROM of right foot Yes    Gait, antalgic     Decreased strength of lower extremity      Physician: Kenneth Melendez MD    Visit Date: 3/11/2022    Physician Orders: PT Eval and Treat   Medical Diagnosis from Referral: M76.61 (ICD-10-CM) - Tendonitis, Achilles, right  Evaluation Date: 1/4/2022  Authorization Period Expiration: 11/29/2022  Plan of Care Expiration: 4/4/22  Visit # / Visits authorized: 5/ 20  PN Due: 3/4/22     Time In: 1305  Time Out: 1401  Total Billable Time: 56 minutes     Precautions: Standard and HTN      Subjective     Pt reports: After he left his last visit he had some bad cramping in the bottom of his foot. Other than that he has not had any pain.   He was compliant with home exercise program.  Response to previous treatment: hip soreness  Functional change: ongoing    Pain: 0/10  Location: right achilles     Objective     3/11/22:       Strength: Hip     Left Right   Iliopsoas 5/5 5/5   Glute Med 5/5 4+/5   IR 4+/5  4+/5    ER 4+/5 4+/5   Ext 4+/5 4+/5          CMS Impairment/Limitation/Restriction for FOTO Ankle Survey    Therapist reviewed FOTO scores for Enid Barreto on 3/11/2022.   FOTO documents entered into [employer] - see Media section.    Limitation Score: 18%        Enid participated in therex including strengthening and stretch for 56 minutes:      TM 6' 3.1 mph  slantboard stretch 30"x3  soleus stretch [id number]    eccentric heel raises shuttle +3x10 +3 cords   Single leg squat airex on shuttle 3 cords +3x10  +shuttle eccentric running 30"x3 1 cord    SLS airex 3x30 secs  tandem stance +airex 2x30 secs each  Standing hip abduction +GTB + airex x20 each (no shoes)  Standing hip extension +GTB + airex x20 each (no shoes)    Side stepping GTB @ ankle x2laps (no shoes)  monster walks GTB @ ankles  x2laps +retro (no " shoes)    [place] x3mins  tennis ball roll x3 mins  arch lifts 20x    Sled push/pull +35# x2laps    ENID received the following manual therapy techniques: Soft tissue Mobilization were applied to the:  for 00 minutes, including:     STM right gastroc and achilles tendon    ENID participated in neuromuscular re-education activities to improve:  for  minutes. The following activities were included:        ENID received hot pack for  minutes to .    ENID received cold pack for  minutes to .      Home Exercises Provided and Patient Education Provided     Education provided:   - arch lifts added to HEP  - roll of arch with foot body mechanics and stress on the achilles    Written Home Exercises Provided: Patient instructed to cont prior HEP.  Exercises were reviewed and ENID was able to demonstrate them prior to the end of the session.  ENID demonstrated good  understanding of the education provided.     See EMR under Patient Instructions for exercises provided prior visit.    Assessment     Enid arrived to today's session without pain. Pt was reassessed and has met all STG and 1/4 STG. FOTO score indicated 18% limitation which is a significant improvement from 31% limitation at pt's initial evaluation. He self reported that he has been able to maintain a gym routine without significant increase in pain. At this time he would like to not schedule anymore follow up visits. However, if he starts to have pain within the next month he will content clinic to schedule another follow up visit.   ENID Is progressing well towards his goals.   Pt prognosis is Good.     Pt will continue to benefit from skilled outpatient physical therapy to address the deficits listed in the problem list box on initial evaluation, provide pt/family education and to maximize pt's level of independence in the home and community environment.     Pt's spiritual, cultural and educational needs considered and pt agreeable to plan of care and goals.    "  Anticipated barriers to physical therapy: covid    Short Term GOALS: 5 weeks. Pt agrees with goals set. progressing  1. Patient demonstrates independence with HEP. met  2. Patient demonstrates independence with Postural Awareness. met  3. Patient demonstrates independence with body mechanics. met  4.Patient demonstrates ability to walk 1 mile or greater without increase in pain to improve tolerance to functional tasks met     Long Term GOALS: 10 weeks. Pt agrees with goals set. progressing  1. Patient demonstrates ability to perform 10 box jumps on 18" plyo box with proper mechanics to allow pt to return to gym routine  2. Patient demonstrates increased hip strength BLE's to 5/5 or greater to improve tolerance to functional activities pain free.   3. Pt has FOTO score of 23% limitation or better met  4. Patient demonstrates ability to run 1/3 mile with no increase in pain      Plan     Plan of care Certification: 1/4/2022 to 4/4/22.     Outpatient Physical Therapy 2 times weekly for 10 weeks to include the following interventions: Gait Training, Manual Therapy, Moist Heat/ Ice, Neuromuscular Re-ed, Patient Education, Therapeutic Activities, Therapeutic Exercise and FDN      Sulma Ferguson, PT,DPT  03/11/2022        "

## 2024-09-04 DIAGNOSIS — M17.11 OSTEOARTHRITIS OF RIGHT KNEE, UNSPECIFIED OSTEOARTHRITIS TYPE: ICD-10-CM

## 2024-09-04 DIAGNOSIS — G89.29 CHRONIC LEFT SHOULDER PAIN: Primary | ICD-10-CM

## 2024-09-04 DIAGNOSIS — M25.512 CHRONIC LEFT SHOULDER PAIN: Primary | ICD-10-CM

## 2024-09-25 ENCOUNTER — CLINICAL SUPPORT (OUTPATIENT)
Dept: REHABILITATION | Facility: OTHER | Age: 49
End: 2024-09-25
Payer: MEDICAID

## 2024-09-25 DIAGNOSIS — M25.561 CHRONIC PAIN OF RIGHT KNEE: Primary | ICD-10-CM

## 2024-09-25 DIAGNOSIS — M25.661 DECREASED RANGE OF MOTION OF RIGHT KNEE: ICD-10-CM

## 2024-09-25 DIAGNOSIS — M25.512 ACUTE PAIN OF LEFT SHOULDER: ICD-10-CM

## 2024-09-25 DIAGNOSIS — Z74.09 IMPAIRED MOBILITY AND ACTIVITIES OF DAILY LIVING: ICD-10-CM

## 2024-09-25 DIAGNOSIS — M25.612 DECREASED RANGE OF MOTION OF LEFT SHOULDER: ICD-10-CM

## 2024-09-25 DIAGNOSIS — G89.29 CHRONIC PAIN OF RIGHT KNEE: Primary | ICD-10-CM

## 2024-09-25 DIAGNOSIS — Z78.9 IMPAIRED MOBILITY AND ACTIVITIES OF DAILY LIVING: ICD-10-CM

## 2024-09-25 PROCEDURE — 97162 PT EVAL MOD COMPLEX 30 MIN: CPT | Mod: PN

## 2024-09-25 NOTE — PLAN OF CARE
[facility]  Physical Therapy Initial Evaluation    Name: Enid Barreto  Clinic Number: [id number]    Therapy Diagnosis:   Encounter Diagnoses   Name Primary?    Chronic pain of right knee Yes    Decreased range of motion of right knee     Acute pain of left shoulder     Decreased range of motion of left shoulder     Impaired mobility and activities of daily living      Physician: Annie Schroeder MD    Physician Orders: PT Eval and Treat   Medical Diagnosis from Referral: Chronic left shoulder pain [M25.512, G89.29], Osteoarthritis of right knee, unspecified osteoarthritis type [M17.11]   Evaluation Date: 9/25/2024  Authorization Period Expiration: 9/4/2025  Plan of Care Expiration: 12/31/2024  Visit # / Visits authorized: 1/ 1; future visits pending  FOTO 1st follow up:  FOTO 2nd follow up:    Time In: 2:00 p  Time Out: 2:55 p  Total Billable Time: 55 minutes    Precautions: Standard    Subjective   Date of onset: Chronic  History of current condition - Enid is a 49 year old male that presents with Right chronic knee pain and Left shoulder pain. Patient states that he notices his knee pain with step up, sit to stand, and stair climbing. The Left shoulder will flare up at random and is not specific to a movement. He notes pain with playing with his son at times. Patient mentions that the Left shoulder feels different when performing bench press exercise as well. Patient reports left shoulder pain feels like a muscle spasm. The Right knee is sharp, intermittent and does not last too long.        Past Medical History:   Diagnosis Date    Hypertension      Enid Barreto  has no past surgical history on file.    Enid has a current medication list which includes the following prescription(s): naproxen.    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   Prior Level  of Function: Independent with all ADL/iADLs   Current Level of Function: Independent with all ADL/iADLs     Pain:  Current 3/10, worst 6/10, best 3/10   Location: right knee, Left shoulder  Description: Aching, Dull, Tight, and Deep  Aggravating Factors: Standing, Walking, Night Time, Morning, Extension, Flexing, Lifting, and Getting out of bed/chair  Easing Factors: pain medication, ice, lying down, heating pad, rest, and elevation    Pts goals: improve his knee mobility, improve his Left shoulder mobility and strength    Objective     Knee:  Observation: Muscle Atrophy RLE>LLE quad/hamstring, Gastroc/soleus dominant bilaterally     Posture: resting in femoral adduction internal rotation    Functional tests:   SL Lunge: Difficult and painful on R at 90/90 compared to L   SLS EO: R trunk lean during R stance    Range of Motion (Passive):   Knee Right  Left    Flexion 140 140   Extension -3 -3     Range of Motion (Active):   Knee Right  Left    Flexion 135 135   Extension 0 0       Lower Extremity Strength  Right LE  Left LE    Quadriceps:  80# Quadriceps: 91#   Hamstrings: Medial: 22.2#  Lateral: 34.7# Hamstrings: Medial: 26.6#  Lateral: 40.3#   Hip extension:  4-/5 Hip extension: 4-/5   PGM: 22.2# PGM:  48.3#     Special Tests:   Right Left   Valgus Stress Test - -   Varus Stress test - -   Lachman's test - -   Posterior Lachman - -   Norma's Test - -   Thessaly's Test - -   Patellar Grind Test - -     Joint Mobility: 2/6 patellofemoral joint and tibiofemoral joint     Palpation: TTP lateral patellofemoral joint    Sensation: Intact      Shoulder   Observation: Scar on Left lateral arm at Triceps Surae    Posture: Superiorly migrated humeral head    Passive Range of Motion:   Shoulder Right Left   Flexion NT NT   Abduction NT NT   ER at 90 NT NT   IR NT NT      Active Range of Motion:   Shoulder Right Left   Flexion 150 140    Abduction 145 135 *pain   ER at 90 90 90   IR 70 70     Strength:  Shoulder Right Left  "  Flexion 5/5 5/5   Abduction 5/5 5/5   ER 4+/5 3+/5   IR 5/5 4-/5   Serratus Anterior 4+/5 4-/5   Middle Trap NT NT   Low Trap NT NT       Special Tests:  Impingement Cluster:   Right Left   Hawkin's Kenndy negative positive   Painful Arc negative positive   Resisted ER negative positive      Rotator Cuff Tear   Right Left   Painful Arc - +   Drop Arm test NT NT   Resisted ER - +     Other:   Right Left   Subscapularis Lift Off Test NT NT   Empty Can NT NT     Joint Mobility: Hypomobility L GH joint inferiorly and posteriorly    Palpation: TTP along Supraspinatus    Sensation: Intact      Intake Outcome Measure for FOTO Knee/Shoulder Survey    Therapist reviewed FOTO scores for Enid Barreto on 9/25/2024.   FOTO documents entered into [employer] - see Media section.    Intake Score: 39% (Shoulder)/ 46%   Predicted Score: 30% (Shoulder)/ 31%    Primary Measure Score Range Intake Score Score Interpretation KOOS, JR. 0 - 100 61.6 Lower Score = Greater Disability  Primary Measure Score Range Intake Score Score Interpretation DASH 100 - 0 21.7 Higher Score = Greater Disability          TREATMENT   Treatment Time In: 2:45  Treatment Time Out: 3:00  Total Treatment time separate from Evaluation: 00 minutes    Enid participated in dynamic functional therapeutic activities to improve functional performance for 00  minutes, including:  Wall side lying hip ABD x15  Knee Ext Iso w/ belt x10 10"  ER Bridge x15    Enid received the following manual therapy techniques: Joint mobilizations were applied to the: Left shoulder/Right Knee for 00 minutes, including:  L shoulder posterior glide  L shoulder inferior glide  Home Exercises and Patient Education Provided    Education provided re: prognosis, activity modification, goals for therapy, role of therapy for care, exercises/HEP    Written Home Exercises Provided: Yes.  Exercises were reviewed and Enid was able to demonstrate them prior to the end of the session.   Pt received a " written copy of exercises to perform at home. Enid demonstrated good understanding of the education provided.     See EMR under patient instructions for exercises given.   Assessment   Enid is a 49 y.o. male referred to outpatient Physical Therapy with a medical diagnosis of right chronic knee pain and Left shoulder pain. Pt presents with R femoral adduction internal rotation syndrome leading to patellofemoral arthropathy with concomitant L shoulder superior glide syndrome with insufficient RTC muscle performance.     Pt prognosis is Fair.   Pt will benefit from skilled outpatient Physical Therapy to address the deficits stated above and in the chart below, provide pt/family education, and to maximize pt's level of independence.     Plan of care discussed with patient: Yes  Patient's spiritual, cultural and educational needs considered and patient is agreeable to the plan of care and goals as stated below:     Anticipated Barriers for therapy: None    Medical Necessity is demonstrated by the following  History  Co-morbidities and personal factors that may impact the plan of care [] LOW: no personal factors / co-morbidities  [x] MODERATE: 1-2 personal factors / co-morbidities  [] HIGH: 3+ personal factors / co-morbidities    Moderate / High Support Documentation:   HTN     Examination  Body Structures and Functions, activity limitations and participation restrictions that may impact the plan of care [] LOW: addressing 1-2 elements  [] MODERATE: 3+ elements  [x] HIGH: 4+ elements (please support below)    Moderate / High Support Documentation: difficulty/pain throwing baseball with son, difficulty/pain with lunges, difficulty with squat, difficulty/pain with stairs     Clinical Presentation [] LOW: stable  [x] MODERATE: Evolving  [] HIGH: Unstable     Decision Making/ Complexity Score: moderate       Goals:  Short Term Goals (4 weeks):  1. Patient will have improved AROM of the right knee to 5-0-138 degrees to be able  to perform functional squats.  2. Patient will have decreased complaints of pain to 0/10.  3. Patient will be independent and compliant with issued HEP.    Long Term Goals (8 weeks):   Patient will have improved AROM of the right knee to 5-0-140 degrees to be able to perform walking lunges.  2. Patient will have improved strength of the right knee to >95% of the Left Lower Extremity.  3. Patient will be able to resume prior functional level with no deficits.   4. Patient will have overall improvement in condition to have increased score on KOOS to 85% to show clinically important difference in patient perceived functional ability.  5. Pt will improve FOTO limitation score to less than or equal to 31% for improved self perception of functional performance with daily activities.    Plan   Plan of care Certification: 9/25/2024 to 12/31/2024.    Outpatient Physical Therapy 2 times weekly for 12 weeks to include the following interventions: Cervical/Lumbar Traction, Electrical Stimulation as needed, Gait Training, Manual Therapy, Moist Heat/ Ice, Neuromuscular Re-ed, Orthotic Management and Training, Patient Education, Self Care, Therapeutic Activities, and Therapeutic Exercise, Blood Flow Restriction Training, Trigger Point Dry Needling as needed.      Francisco Wu PT, DPT  Board Certified Orthopaedic Clinical Specialist

## 2024-09-30 ENCOUNTER — CLINICAL SUPPORT (OUTPATIENT)
Dept: REHABILITATION | Facility: OTHER | Age: 49
End: 2024-09-30
Payer: MEDICAID

## 2024-09-30 DIAGNOSIS — M25.512 ACUTE PAIN OF LEFT SHOULDER: ICD-10-CM

## 2024-09-30 DIAGNOSIS — M25.612 DECREASED RANGE OF MOTION OF LEFT SHOULDER: ICD-10-CM

## 2024-09-30 DIAGNOSIS — Z74.09 IMPAIRED MOBILITY AND ACTIVITIES OF DAILY LIVING: ICD-10-CM

## 2024-09-30 DIAGNOSIS — G89.29 CHRONIC PAIN OF RIGHT KNEE: Primary | ICD-10-CM

## 2024-09-30 DIAGNOSIS — Z78.9 IMPAIRED MOBILITY AND ACTIVITIES OF DAILY LIVING: ICD-10-CM

## 2024-09-30 DIAGNOSIS — M25.561 CHRONIC PAIN OF RIGHT KNEE: Primary | ICD-10-CM

## 2024-09-30 DIAGNOSIS — M25.661 DECREASED RANGE OF MOTION OF RIGHT KNEE: ICD-10-CM

## 2024-09-30 PROCEDURE — 97110 THERAPEUTIC EXERCISES: CPT | Mod: PN

## 2024-09-30 NOTE — PROGRESS NOTES
[facility]   Physical Therapy Treatment Note      Name: Enid Barreto  [facility] Number: [id number]    Therapy Diagnosis:   Encounter Diagnoses   Name Primary?    Chronic pain of right knee Yes    Decreased range of motion of right knee     Acute pain of left shoulder     Decreased range of motion of left shoulder     Impaired mobility and activities of daily living      Physician: Annie Schroeder MD    Visit Date: 9/30/2024    Physician Orders: PT Eval and Treat   Medical Diagnosis from Referral: Chronic left shoulder pain [M25.512, G89.29], Osteoarthritis of right knee, unspecified osteoarthritis type [M17.11]   Evaluation Date: 9/25/2024  Authorization Period Expiration: 9/4/2025  Plan of Care Expiration: 12/31/2024  Visit # / Visits authorized: 1/ 20  FOTO 1st follow up:  FOTO 2nd follow up:    PTA Visit #: 0/5     Precautions: Standard    Time In: 1:00 pm  Time Out: 1:55 pm  Total Appointment Time: 55 minutes    Subjective     Patient reports: that he is doing well today.  He was compliant with home exercise program.  Response to previous treatment: first follow up  Functional change: too early    Pain: 2/10  Location: right knee      Objective    Asterisk Signs:   Knee:  Observation: Muscle Atrophy RLE>LLE quad/hamstring, Gastroc/soleus dominant bilaterally      Posture: resting in femoral adduction internal rotation     Functional tests:   SL Lunge: Difficult and painful on R at 90/90 compared to L   SLS EO: R trunk lean during R stance     Range of Motion (Passive):   Knee Right  Left    Flexion 140 140   Extension -3 -3      Range of Motion (Active):   Knee Right  Left    Flexion 135 135   Extension 0 0         Lower Extremity Strength  Right LE   Left LE     Quadriceps:  80# Quadriceps: 91#   Hamstrings: Medial: 22.2#  Lateral: 34.7# Hamstrings: Medial: 26.6#  Lateral: 40.3#   Hip extension:  4-/5 Hip extension: 4-/5   PGM: 22.2# PGM:  48.3#      Special Tests:    Right Left  "  Valgus Stress Test - -   Varus Stress test - -   Lachman's test - -   Posterior Lachman - -   Norma's Test - -   Thessaly's Test - -   Patellar Grind Test - -      Joint Mobility: 2/6 patellofemoral joint and tibiofemoral joint      Palpation: TTP lateral patellofemoral joint     Sensation: Intact        Shoulder   Observation: Scar on Left lateral arm at Triceps Surae     Posture: Superiorly migrated humeral head     Passive Range of Motion:   Shoulder Right Left   Flexion NT NT   Abduction NT NT   ER at 90 NT NT   IR NT NT      Active Range of Motion:   Shoulder Right Left   Flexion 150 140    Abduction 145 135 *pain   ER at 90 90 90   IR 70 70      Strength:  Shoulder Right Left   Flexion 5/5 5/5   Abduction 5/5 5/5   ER 4+/5 3+/5   IR 5/5 4-/5   Serratus Anterior 4+/5 4-/5   Middle Trap NT NT   Low Trap NT NT         Special Tests:  Impingement Cluster:    Right Left   Hawkin's Kenndy negative positive   Painful Arc negative positive   Resisted ER negative positive                 Rotator Cuff Tear    Right Left   Painful Arc - +   Drop Arm test NT NT   Resisted ER - +      Other:    Right Left   Subscapularis Lift Off Test NT NT   Empty Can NT NT      Joint Mobility: Hypomobility L GH joint inferiorly and posteriorly     Palpation: TTP along Supraspinatus     Sensation: Intact    Objective Measures updated at progress report unless specified.     Treatment     Enid received the treatments listed below:      therapeutic exercises to develop strength, endurance, ROM, flexibility, posture, and core stabilization for 43 minutes including:  Recumbent Bike 10' Level 4.0  Bridges 30x's   Sidelying Hip Abduction 25x's each Lower Extremity   Shuttle Squats 2 cords 30x's   Knee Extension Isometric Hold 5 x 45" holds   Lateral Walks with Band Green 2 laps   Shoulder IR Red Band 30x's   Shoulder ER Red Band 30x's   Wall Slides 20x's     manual therapy techniques (billed as therex): Joint mobilizations were applied to " the: Right Knee/Left Shoulder for 10 minutes, including:  Lateral Gapping Grade II  Flexion Gapping Grade III      Patient Education and Home Exercises       Education provided:   - Patient was provided education on for continued compliance with HEP for continued functional mobility and strength gains for return to prior level of function.      Written Home Exercises Provided: Patient instructed to cont prior HEP. Exercises were reviewed and Enid was able to demonstrate them prior to the end of the session.  Enid demonstrated good  understanding of the education provided. See Electronic Medical Record under Patient Instructions for exercises provided during therapy sessions    Assessment   Patient presents to the clinic with mild symptom irritability pre-session and no irritability post-session. Manual therapy was performed to Right knee and Left shoulder to improve joint play and allow for optimal movement during corrective exercises.   Patient demonstrates improving Right lateral knee pain with increased motor control of the Right Lower Extremity with hip strengthening exercises.   Patient demonstrated improvements in Right Lower Extremity motor control within session.     Patient will continue to benefit from skilled outpatient physical therapy to address the deficits listed in the problem list box on initial evaluation, provide patient/family education and to maximize patient's level of independence in the home and community environment.     Enid Is progressing well towards his goals.   Patient prognosis is Good.     Patient's spiritual, cultural and educational needs considered and pt agreeable to plan of care and goals.     Anticipated barriers to physical therapy: None    Goals:   Short Term Goals (4 weeks):  1. Patient will have improved AROM of the right knee to 5-0-138 degrees to be able to perform functional squats.  2. Patient will have decreased complaints of pain to 0/10.  3. Patient will be  independent and compliant with issued HEP.     Long Term Goals (8 weeks):   Patient will have improved AROM of the right knee to 5-0-140 degrees to be able to perform walking lunges.  2. Patient will have improved strength of the right knee to >95% of the Left Lower Extremity.  3. Patient will be able to resume prior functional level with no deficits.   4. Patient will have overall improvement in condition to have increased score on KOOS to 85% to show clinically important difference in patient perceived functional ability.  5. Pt will improve FOTO limitation score to less than or equal to 31% for improved self perception of functional performance with daily activities.     Plan   Plan of care Certification: 9/25/2024 to 12/31/2024.     Outpatient Physical Therapy 2 times weekly for 12 weeks to include the following interventions: Cervical/Lumbar Traction, Electrical Stimulation as needed, Gait Training, Manual Therapy, Moist Heat/ Ice, Neuromuscular Re-ed, Orthotic Management and Training, Patient Education, Self Care, Therapeutic Activities, and Therapeutic Exercise, Blood Flow Restriction Training, Trigger Point Dry Needling as needed.    Francisco Wu PT, DPT  Board Certified Orthopaedic Clinical Specialist

## 2024-10-07 ENCOUNTER — CLINICAL SUPPORT (OUTPATIENT)
Dept: REHABILITATION | Facility: OTHER | Age: 49
End: 2024-10-07
Payer: MEDICAID

## 2024-10-07 DIAGNOSIS — M25.612 DECREASED RANGE OF MOTION OF LEFT SHOULDER: ICD-10-CM

## 2024-10-07 DIAGNOSIS — M25.661 DECREASED RANGE OF MOTION OF RIGHT KNEE: ICD-10-CM

## 2024-10-07 DIAGNOSIS — Z78.9 IMPAIRED MOBILITY AND ACTIVITIES OF DAILY LIVING: ICD-10-CM

## 2024-10-07 DIAGNOSIS — G89.29 CHRONIC PAIN OF RIGHT KNEE: Primary | ICD-10-CM

## 2024-10-07 DIAGNOSIS — M25.561 CHRONIC PAIN OF RIGHT KNEE: Primary | ICD-10-CM

## 2024-10-07 DIAGNOSIS — Z74.09 IMPAIRED MOBILITY AND ACTIVITIES OF DAILY LIVING: ICD-10-CM

## 2024-10-07 DIAGNOSIS — M25.512 ACUTE PAIN OF LEFT SHOULDER: ICD-10-CM

## 2024-10-07 PROCEDURE — 97110 THERAPEUTIC EXERCISES: CPT | Mod: PN

## 2024-10-07 NOTE — PROGRESS NOTES
[facility]   Physical Therapy Treatment Note      Name: Enid Barreto  [facility] Number: [id number]    Therapy Diagnosis:   Encounter Diagnoses   Name Primary?    Chronic pain of right knee Yes    Decreased range of motion of right knee     Acute pain of left shoulder     Decreased range of motion of left shoulder     Impaired mobility and activities of daily living        Physician: Annie Schroeder MD    Visit Date: 10/7/2024    Physician Orders: PT Eval and Treat   Medical Diagnosis from Referral: Chronic left shoulder pain [M25.512, G89.29], Osteoarthritis of right knee, unspecified osteoarthritis type [M17.11]   Evaluation Date: 9/25/2024  Authorization Period Expiration: 9/4/2025  Plan of Care Expiration: 12/31/2024  Visit # / Visits authorized: 2 / 20  FOTO 1st follow up:  FOTO 2nd follow up:    PTA Visit #: 0/5     Precautions: Standard    Time In: 12:55 pm  Time Out: 1:50 pm  Total Appointment Time: 55 minutes    Subjective     Patient reports: that he got an X-ray and showed a Fabella and some OA changes.   He was compliant with home exercise program.  Response to previous treatment: improved mobility and pain   Functional change: too early    Pain: 2/10  Location: right knee      Objective    Asterisk Signs:   Knee:  Observation: Muscle Atrophy RLE>LLE quad/hamstring, Gastroc/soleus dominant bilaterally      Posture: resting in femoral adduction internal rotation     Functional tests:   SL Lunge: Difficult and painful on R at 90/90 compared to L   SLS EO: R trunk lean during R stance     Range of Motion (Passive):   Knee Right  Left    Flexion 140 140   Extension -3 -3      Range of Motion (Active):   Knee Right  Left    Flexion 135 135   Extension 0 0         Lower Extremity Strength  Right LE   Left LE     Quadriceps:  80# Quadriceps: 91#   Hamstrings: Medial: 22.2#  Lateral: 34.7# Hamstrings: Medial: 26.6#  Lateral: 40.3#   Hip extension:  4-/5 Hip extension: 4-/5   PGM:  "22.2# PGM:  48.3#      Special Tests:    Right Left   Valgus Stress Test - -   Varus Stress test - -   Lachman's test - -   Posterior Lachman - -   Norma's Test - -   Thessaly's Test - -   Patellar Grind Test - -      Joint Mobility: 2/6 patellofemoral joint and tibiofemoral joint      Palpation: TTP lateral patellofemoral joint     Sensation: Intact        Shoulder   Observation: Scar on Left lateral arm at Triceps Surae     Posture: Superiorly migrated humeral head     Passive Range of Motion:   Shoulder Right Left   Flexion NT NT   Abduction NT NT   ER at 90 NT NT   IR NT NT      Active Range of Motion:   Shoulder Right Left   Flexion 150 140    Abduction 145 135 *pain   ER at 90 90 90   IR 70 70      Strength:  Shoulder Right Left   Flexion 5/5 5/5   Abduction 5/5 5/5   ER 4+/5 3+/5   IR 5/5 4-/5   Serratus Anterior 4+/5 4-/5   Middle Trap NT NT   Low Trap NT NT         Special Tests:  Impingement Cluster:    Right Left   Hawkin's Kenndy negative positive   Painful Arc negative positive   Resisted ER negative positive                 Rotator Cuff Tear    Right Left   Painful Arc - +   Drop Arm test NT NT   Resisted ER - +      Other:    Right Left   Subscapularis Lift Off Test NT NT   Empty Can NT NT      Joint Mobility: Hypomobility L GH joint inferiorly and posteriorly     Palpation: TTP along Supraspinatus     Sensation: Intact    Objective Measures updated at progress report unless specified.     Treatment     Enid received the treatments listed below:      therapeutic exercises to develop strength, endurance, ROM, flexibility, posture, and core stabilization for 43 minutes including:  Recumbent Bike 10' Level 4.0  Bridges 3 x 8 reps (Single Leg) 12" Box   Sidelying Hip Abduction 25x's each Lower Extremity   Shuttle Squats 2 cords 30x's   Knee Extension Isometric Hold 5 x 45" holds   Lateral Walks with Band Green 2 laps   Shoulder IR Red Band 30x's   Shoulder ER Red Band 30x's   Serratus Punch 30x's 25# "     manual therapy techniques (billed as therex): Joint mobilizations were applied to the: Right Knee/Left Shoulder for 10 minutes, including:  Lateral Gapping Grade II  Flexion Gapping Grade III    Patient Education and Home Exercises       Education provided:   - Patient was provided education on for continued compliance with HEP for continued functional mobility and strength gains for return to prior level of function.      Written Home Exercises Provided: Patient instructed to cont prior HEP. Exercises were reviewed and Enid was able to demonstrate them prior to the end of the session.  Enid demonstrated good  understanding of the education provided. See Electronic Medical Record under Patient Instructions for exercises provided during therapy sessions    Assessment   Patient presents to the clinic with mild symptom irritability pre-session and no irritability post-session. Manual therapy was performed to Right knee and Left shoulder to improve joint play and allow for optimal movement during corrective exercises.   Patient demonstrates improving Right lateral knee pain with increased motor control of the Right Lower Extremity with hip strengthening exercises.   Patient demonstrated improvements in Right Lower Extremity motor control within session.     Patient will continue to benefit from skilled outpatient physical therapy to address the deficits listed in the problem list box on initial evaluation, provide patient/family education and to maximize patient's level of independence in the home and community environment.     Enid Is progressing well towards his goals.   Patient prognosis is Good.     Patient's spiritual, cultural and educational needs considered and pt agreeable to plan of care and goals.     Anticipated barriers to physical therapy: None    Goals:   Short Term Goals (4 weeks):  1. Patient will have improved AROM of the right knee to 5-0-138 degrees to be able to perform functional squats.  2.  Patient will have decreased complaints of pain to 0/10.  3. Patient will be independent and compliant with issued HEP.     Long Term Goals (8 weeks):   Patient will have improved AROM of the right knee to 5-0-140 degrees to be able to perform walking lunges.  2. Patient will have improved strength of the right knee to >95% of the Left Lower Extremity.  3. Patient will be able to resume prior functional level with no deficits.   4. Patient will have overall improvement in condition to have increased score on KOOS to 85% to show clinically important difference in patient perceived functional ability.  5. Pt will improve FOTO limitation score to less than or equal to 31% for improved self perception of functional performance with daily activities.     Plan   Plan of care Certification: 9/25/2024 to 12/31/2024.     Outpatient Physical Therapy 2 times weekly for 12 weeks to include the following interventions: Cervical/Lumbar Traction, Electrical Stimulation as needed, Gait Training, Manual Therapy, Moist Heat/ Ice, Neuromuscular Re-ed, Orthotic Management and Training, Patient Education, Self Care, Therapeutic Activities, and Therapeutic Exercise, Blood Flow Restriction Training, Trigger Point Dry Needling as needed.    Francisco Wu PT, DPT  Board Certified Orthopaedic Clinical Specialist

## 2024-10-14 ENCOUNTER — CLINICAL SUPPORT (OUTPATIENT)
Dept: REHABILITATION | Facility: OTHER | Age: 49
End: 2024-10-14
Payer: MEDICAID

## 2024-10-14 DIAGNOSIS — M25.512 ACUTE PAIN OF LEFT SHOULDER: ICD-10-CM

## 2024-10-14 DIAGNOSIS — G89.29 CHRONIC PAIN OF RIGHT KNEE: Primary | ICD-10-CM

## 2024-10-14 DIAGNOSIS — Z78.9 IMPAIRED MOBILITY AND ACTIVITIES OF DAILY LIVING: ICD-10-CM

## 2024-10-14 DIAGNOSIS — Z74.09 IMPAIRED MOBILITY AND ACTIVITIES OF DAILY LIVING: ICD-10-CM

## 2024-10-14 DIAGNOSIS — M25.612 DECREASED RANGE OF MOTION OF LEFT SHOULDER: ICD-10-CM

## 2024-10-14 DIAGNOSIS — M25.661 DECREASED RANGE OF MOTION OF RIGHT KNEE: ICD-10-CM

## 2024-10-14 DIAGNOSIS — M25.561 CHRONIC PAIN OF RIGHT KNEE: Primary | ICD-10-CM

## 2024-10-14 PROCEDURE — 97110 THERAPEUTIC EXERCISES: CPT | Mod: PN

## 2024-10-14 NOTE — PROGRESS NOTES
[facility]   Physical Therapy Treatment Note      Name: Enid Barreto  [facility] Number: [id number]    Therapy Diagnosis:   Encounter Diagnoses   Name Primary?    Chronic pain of right knee Yes    Decreased range of motion of right knee     Acute pain of left shoulder     Decreased range of motion of left shoulder     Impaired mobility and activities of daily living          Physician: Annie Schroeder MD    Visit Date: 10/14/2024    Physician Orders: PT Eval and Treat   Medical Diagnosis from Referral: Chronic left shoulder pain [M25.512, G89.29], Osteoarthritis of right knee, unspecified osteoarthritis type [M17.11]   Evaluation Date: 9/25/2024  Authorization Period Expiration: 9/4/2025  Plan of Care Expiration: 12/31/2024  Visit # / Visits authorized: 3 / 20  FOTO 1st follow up:  FOTO 2nd follow up:    PTA Visit #: 0/5     Precautions: Standard    Time In: 12:45 pm  Time Out: 1:40 pm  Total Appointment Time: 55 minutes    Subjective     Patient reports: that he is doing well. Felt a little pain with cutting with his son.   He was compliant with home exercise program.  Response to previous treatment: improved mobility and pain   Functional change: too early    Pain: 2/10  Location: right knee      Objective    Asterisk Signs:   Knee:  Observation: Muscle Atrophy RLE>LLE quad/hamstring, Gastroc/soleus dominant bilaterally      Posture: resting in femoral adduction internal rotation     Functional tests:   SL Lunge: Difficult and painful on R at 90/90 compared to L   SLS EO: R trunk lean during R stance     Range of Motion (Passive):   Knee Right  Left    Flexion 140 140   Extension -3 -3      Range of Motion (Active):   Knee Right  Left    Flexion 135 135   Extension 0 0         Lower Extremity Strength  Right LE   Left LE     Quadriceps:  80# Quadriceps: 91#   Hamstrings: Medial: 22.2#  Lateral: 34.7# Hamstrings: Medial: 26.6#  Lateral: 40.3#   Hip extension:  4-/5 Hip extension:  "4-/5   PGM: 22.2# PGM:  48.3#      Special Tests:    Right Left   Valgus Stress Test - -   Varus Stress test - -   Lachman's test - -   Posterior Lachman - -   Norma's Test - -   Thessaly's Test - -   Patellar Grind Test - -      Joint Mobility: 2/6 patellofemoral joint and tibiofemoral joint      Palpation: TTP lateral patellofemoral joint     Sensation: Intact        Shoulder   Observation: Scar on Left lateral arm at Triceps Surae     Posture: Superiorly migrated humeral head     Passive Range of Motion:   Shoulder Right Left   Flexion NT NT   Abduction NT NT   ER at 90 NT NT   IR NT NT      Active Range of Motion:   Shoulder Right Left   Flexion 150 140    Abduction 145 135 *pain   ER at 90 90 90   IR 70 70      Strength:  Shoulder Right Left   Flexion 5/5 5/5   Abduction 5/5 5/5   ER 4+/5 3+/5   IR 5/5 4-/5   Serratus Anterior 4+/5 4-/5   Middle Trap NT NT   Low Trap NT NT         Special Tests:  Impingement Cluster:    Right Left   Hawkin's Kenndy negative positive   Painful Arc negative positive   Resisted ER negative positive                 Rotator Cuff Tear    Right Left   Painful Arc - +   Drop Arm test NT NT   Resisted ER - +      Other:    Right Left   Subscapularis Lift Off Test NT NT   Empty Can NT NT      Joint Mobility: Hypomobility L GH joint inferiorly and posteriorly     Palpation: TTP along Supraspinatus     Sensation: Intact    Objective Measures updated at progress report unless specified.     Treatment     Enid received the treatments listed below:      therapeutic exercises to develop strength, endurance, ROM, flexibility, posture, and core stabilization for 43 minutes including:  Recumbent Bike 10' Level 4.0  Bridges 3 x 8 reps (Single Leg) 12" Box   Step Up 12" Box 3 x 10 reps   Shuttle Squats 2 cords 30x's Single Leg   Knee Extension Matrix 35# 3 x 10 reps   Lateral Walks with Band Green 3 laps   Shoulder IR Red Band 30x's   Shoulder ER Red Band 30x's   Serratus Punch 30x's 25# "     manual therapy techniques (billed as therex): Joint mobilizations were applied to the: Right Knee/Left Shoulder for 10 minutes, including:  Lateral Gapping Grade II  Flexion Gapping Grade III    Patient Education and Home Exercises       Education provided:   - Patient was provided education on for continued compliance with HEP for continued functional mobility and strength gains for return to prior level of function.      Written Home Exercises Provided: Patient instructed to cont prior HEP. Exercises were reviewed and Enid was able to demonstrate them prior to the end of the session.  Enid demonstrated good  understanding of the education provided. See Electronic Medical Record under Patient Instructions for exercises provided during therapy sessions    Assessment   Patient presents to the clinic with mild symptom irritability pre-session and no irritability post-session. Manual therapy was performed to Right knee and Left shoulder to improve joint play and allow for optimal movement during corrective exercises.   Patient demonstrates improving Right lateral knee pain with increased motor control of the Right Lower Extremity with hip strengthening exercises.   Patient demonstrated improvements in Right Lower Extremity motor control within session.     Patient will continue to benefit from skilled outpatient physical therapy to address the deficits listed in the problem list box on initial evaluation, provide patient/family education and to maximize patient's level of independence in the home and community environment.     Enid Is progressing well towards his goals.   Patient prognosis is Good.     Patient's spiritual, cultural and educational needs considered and pt agreeable to plan of care and goals.     Anticipated barriers to physical therapy: None    Goals:   Short Term Goals (4 weeks):  1. Patient will have improved AROM of the right knee to 5-0-138 degrees to be able to perform functional squats.MET  2.  Patient will have decreased complaints of pain to 0/10.MET  3. Patient will be independent and compliant with issued HEP.     Long Term Goals (8 weeks):   Patient will have improved AROM of the right knee to 5-0-140 degrees to be able to perform walking lunges.  2. Patient will have improved strength of the right knee to >95% of the Left Lower Extremity.  3. Patient will be able to resume prior functional level with no deficits.   4. Patient will have overall improvement in condition to have increased score on KOOS to 85% to show clinically important difference in patient perceived functional ability.  5. Pt will improve FOTO limitation score to less than or equal to 31% for improved self perception of functional performance with daily activities.     Plan   Plan of care Certification: 9/25/2024 to 12/31/2024.     Outpatient Physical Therapy 2 times weekly for 12 weeks to include the following interventions: Cervical/Lumbar Traction, Electrical Stimulation as needed, Gait Training, Manual Therapy, Moist Heat/ Ice, Neuromuscular Re-ed, Orthotic Management and Training, Patient Education, Self Care, Therapeutic Activities, and Therapeutic Exercise, Blood Flow Restriction Training, Trigger Point Dry Needling as needed.    Francisco Wu PT, DPT  Board Certified Orthopaedic Clinical Specialist

## 2024-10-15 NOTE — PROGRESS NOTES
[facility]   Physical Therapy Treatment Note      Name: Enid Barreto  [facility] Number: [id number]    Therapy Diagnosis:   Encounter Diagnoses   Name Primary?    Chronic pain of right knee Yes    Decreased range of motion of right knee     Acute pain of left shoulder     Decreased range of motion of left shoulder     Impaired mobility and activities of daily living        Physician: Annie Schroeder MD    Visit Date: 10/16/2024    Physician Orders: PT Eval and Treat   Medical Diagnosis from Referral: Chronic left shoulder pain [M25.512, G89.29], Osteoarthritis of right knee, unspecified osteoarthritis type [M17.11]   Evaluation Date: 9/25/2024  Authorization Period Expiration: 9/4/2025  Plan of Care Expiration: 12/31/2024  Visit # / Visits authorized: 4 / 20  FOTO 1st follow up:  FOTO 2nd follow up:    PTA Visit #: 0/5     Precautions: Standard    Time In: 12:45 pm  Time Out: 1:40 pm  Total Appointment Time: 55 minutes    Subjective     Patient reports: that he is feeling better with the shoulder and knee.   He was compliant with home exercise program.  Response to previous treatment: improved mobility and pain   Functional change: too early    Pain: 2/10  Location: right knee      Objective    Asterisk Signs:   Knee:  Observation: Muscle Atrophy RLE>LLE quad/hamstring, Gastroc/soleus dominant bilaterally      Posture: resting in femoral adduction internal rotation     Functional tests:   SL Lunge: Difficult and painful on R at 90/90 compared to L   SLS EO: R trunk lean during R stance     Range of Motion (Passive):   Knee Right  Left    Flexion 140 140   Extension -3 -3      Range of Motion (Active):   Knee Right  Left    Flexion 135 135   Extension 0 0         Lower Extremity Strength  Right LE   Left LE     Quadriceps:  80# Quadriceps: 91#   Hamstrings: Medial: 22.2#  Lateral: 34.7# Hamstrings: Medial: 26.6#  Lateral: 40.3#   Hip extension:  4-/5 Hip extension: 4-/5   PGM: 22.2#  "PGM:  48.3#      Special Tests:    Right Left   Valgus Stress Test - -   Varus Stress test - -   Lachman's test - -   Posterior Lachman - -   Norma's Test - -   Thessaly's Test - -   Patellar Grind Test - -      Joint Mobility: 2/6 patellofemoral joint and tibiofemoral joint      Palpation: TTP lateral patellofemoral joint     Sensation: Intact        Shoulder   Observation: Scar on Left lateral arm at Triceps Surae     Posture: Superiorly migrated humeral head     Passive Range of Motion:   Shoulder Right Left   Flexion NT NT   Abduction NT NT   ER at 90 NT NT   IR NT NT      Active Range of Motion:   Shoulder Right Left   Flexion 150 140    Abduction 145 135 *pain   ER at 90 90 90   IR 70 70      Strength:  Shoulder Right Left   Flexion 5/5 5/5   Abduction 5/5 5/5   ER 4+/5 3+/5   IR 5/5 4-/5   Serratus Anterior 4+/5 4-/5   Middle Trap NT NT   Low Trap NT NT         Special Tests:  Impingement Cluster:    Right Left   Hawkin's Kenndy negative positive   Painful Arc negative positive   Resisted ER negative positive                 Rotator Cuff Tear    Right Left   Painful Arc - +   Drop Arm test NT NT   Resisted ER - +      Other:    Right Left   Subscapularis Lift Off Test NT NT   Empty Can NT NT      Joint Mobility: Hypomobility L GH joint inferiorly and posteriorly     Palpation: TTP along Supraspinatus     Sensation: Intact    Objective Measures updated at progress report unless specified.     Treatment     Enid received the treatments listed below:      therapeutic exercises to develop strength, endurance, ROM, flexibility, posture, and core stabilization for 43 minutes including:  Recumbent Bike 10' Level 4.0  Bridges 3 x 8 reps (Single Leg) 12" Box   Step Up 12" Box 3 x 10 reps   Shuttle Squats 2 cords 30x's Single Leg   Knee Extension Matrix 35# 3 x 10 reps   Lateral Walks with Band Green 3 laps   Shoulder IR Red Band 30x's   Shoulder ER Red Band 30x's   Serratus Punch 30x's 25#     manual therapy " techniques (billed as therex): Joint mobilizations were applied to the: Right Knee/Left Shoulder for 10 minutes, including:  Lateral Gapping Grade II  Flexion Gapping Grade III    Patient Education and Home Exercises       Education provided:   - Patient was provided education on for continued compliance with HEP for continued functional mobility and strength gains for return to prior level of function.      Written Home Exercises Provided: Patient instructed to cont prior HEP. Exercises were reviewed and Enid was able to demonstrate them prior to the end of the session.  Enid demonstrated good  understanding of the education provided. See Electronic Medical Record under Patient Instructions for exercises provided during therapy sessions    Assessment   Patient presents to the clinic with mild symptom irritability pre-session and no irritability post-session. Manual therapy was performed to Right knee and Left shoulder to improve joint play and allow for optimal movement during corrective exercises.   Patient demonstrates improving Right lateral knee pain with increased motor control of the Right Lower Extremity with hip strengthening exercises.   Patient demonstrated improvements in Right Lower Extremity motor control within session.     Patient will continue to benefit from skilled outpatient physical therapy to address the deficits listed in the problem list box on initial evaluation, provide patient/family education and to maximize patient's level of independence in the home and community environment.     Enid Is progressing well towards his goals.   Patient prognosis is Good.     Patient's spiritual, cultural and educational needs considered and pt agreeable to plan of care and goals.     Anticipated barriers to physical therapy: None    Goals:   Short Term Goals (4 weeks):  1. Patient will have improved AROM of the right knee to 5-0-138 degrees to be able to perform functional squats.MET  2. Patient will have  decreased complaints of pain to 0/10.MET  3. Patient will be independent and compliant with issued HEP.     Long Term Goals (8 weeks):   Patient will have improved AROM of the right knee to 5-0-140 degrees to be able to perform walking lunges.  2. Patient will have improved strength of the right knee to >95% of the Left Lower Extremity.  3. Patient will be able to resume prior functional level with no deficits.   4. Patient will have overall improvement in condition to have increased score on KOOS to 85% to show clinically important difference in patient perceived functional ability.  5. Pt will improve FOTO limitation score to less than or equal to 31% for improved self perception of functional performance with daily activities.     Plan   Plan of care Certification: 9/25/2024 to 12/31/2024.     Outpatient Physical Therapy 2 times weekly for 12 weeks to include the following interventions: Cervical/Lumbar Traction, Electrical Stimulation as needed, Gait Training, Manual Therapy, Moist Heat/ Ice, Neuromuscular Re-ed, Orthotic Management and Training, Patient Education, Self Care, Therapeutic Activities, and Therapeutic Exercise, Blood Flow Restriction Training, Trigger Point Dry Needling as needed.    Francisco Wu PT, DPT  Board Certified Orthopaedic Clinical Specialist

## 2024-10-16 ENCOUNTER — CLINICAL SUPPORT (OUTPATIENT)
Dept: REHABILITATION | Facility: OTHER | Age: 49
End: 2024-10-16
Payer: MEDICAID

## 2024-10-16 DIAGNOSIS — M25.661 DECREASED RANGE OF MOTION OF RIGHT KNEE: ICD-10-CM

## 2024-10-16 DIAGNOSIS — Z74.09 IMPAIRED MOBILITY AND ACTIVITIES OF DAILY LIVING: ICD-10-CM

## 2024-10-16 DIAGNOSIS — M25.561 CHRONIC PAIN OF RIGHT KNEE: Primary | ICD-10-CM

## 2024-10-16 DIAGNOSIS — M25.512 ACUTE PAIN OF LEFT SHOULDER: ICD-10-CM

## 2024-10-16 DIAGNOSIS — M25.612 DECREASED RANGE OF MOTION OF LEFT SHOULDER: ICD-10-CM

## 2024-10-16 DIAGNOSIS — Z78.9 IMPAIRED MOBILITY AND ACTIVITIES OF DAILY LIVING: ICD-10-CM

## 2024-10-16 DIAGNOSIS — G89.29 CHRONIC PAIN OF RIGHT KNEE: Primary | ICD-10-CM

## 2024-10-16 PROCEDURE — 97110 THERAPEUTIC EXERCISES: CPT | Mod: PN

## 2024-10-21 ENCOUNTER — CLINICAL SUPPORT (OUTPATIENT)
Dept: REHABILITATION | Facility: OTHER | Age: 49
End: 2024-10-21
Payer: MEDICAID

## 2024-10-21 DIAGNOSIS — M25.661 DECREASED RANGE OF MOTION OF RIGHT KNEE: ICD-10-CM

## 2024-10-21 DIAGNOSIS — G89.29 CHRONIC PAIN OF RIGHT KNEE: Primary | ICD-10-CM

## 2024-10-21 DIAGNOSIS — M25.612 DECREASED RANGE OF MOTION OF LEFT SHOULDER: ICD-10-CM

## 2024-10-21 DIAGNOSIS — M25.561 CHRONIC PAIN OF RIGHT KNEE: Primary | ICD-10-CM

## 2024-10-21 DIAGNOSIS — Z78.9 IMPAIRED MOBILITY AND ACTIVITIES OF DAILY LIVING: ICD-10-CM

## 2024-10-21 DIAGNOSIS — Z74.09 IMPAIRED MOBILITY AND ACTIVITIES OF DAILY LIVING: ICD-10-CM

## 2024-10-21 DIAGNOSIS — M25.512 ACUTE PAIN OF LEFT SHOULDER: ICD-10-CM

## 2024-10-21 PROCEDURE — 97110 THERAPEUTIC EXERCISES: CPT | Mod: PN

## 2024-10-21 NOTE — PROGRESS NOTES
[facility]   Physical Therapy Treatment Note      Name: Enid Barreto  Clinic Number: [id number]    Therapy Diagnosis:   Encounter Diagnoses   Name Primary?    Chronic pain of right knee Yes    Decreased range of motion of right knee     Acute pain of left shoulder     Decreased range of motion of left shoulder     Impaired mobility and activities of daily living          Physician: Annie Schroeder MD    Visit Date: 10/21/2024    Physician Orders: PT Eval and Treat   Medical Diagnosis from Referral: Chronic left shoulder pain [M25.512, G89.29], Osteoarthritis of right knee, unspecified osteoarthritis type [M17.11]   Evaluation Date: 9/25/2024  Authorization Period Expiration: 9/4/2025  Plan of Care Expiration: 12/31/2024  Visit # / Visits authorized: 5 / 20  FOTO 1st follow up: 16% improvement   FOTO 2nd follow up:    PTA Visit #: 0/5     Precautions: Standard    Time In: 12:50 pm  Time Out: 1:45 pm  Total Appointment Time: 55 minutes    Subjective     Patient reports: that he continues to feel improvement in knee and shoulder pain.   He was compliant with home exercise program.  Response to previous treatment: improved mobility and pain   Functional change: too early    Pain: 2/10  Location: right knee      Objective    Asterisk Signs:   Knee:  Observation: Muscle Atrophy RLE>LLE quad/hamstring, Gastroc/soleus dominant bilaterally      Posture: resting in femoral adduction internal rotation     Functional tests:   SL Lunge: Difficult and painful on R at 90/90 compared to L   SLS EO: R trunk lean during R stance     Range of Motion (Passive):   Knee Right  Left    Flexion 140 140   Extension -3 -3      Range of Motion (Active):   Knee Right  Left    Flexion 135 135   Extension 0 0         Lower Extremity Strength  Right LE   Left LE     Quadriceps:  80# Quadriceps: 91#   Hamstrings: Medial: 22.2#  Lateral: 34.7# Hamstrings: Medial: 26.6#  Lateral: 40.3#   Hip extension:  4-/5 Hip  "extension: 4-/5   PGM: 22.2# PGM:  48.3#      Special Tests:    Right Left   Valgus Stress Test - -   Varus Stress test - -   Lachman's test - -   Posterior Lachman - -   Norma's Test - -   Thessaly's Test - -   Patellar Grind Test - -      Joint Mobility: 2/6 patellofemoral joint and tibiofemoral joint      Palpation: TTP lateral patellofemoral joint     Sensation: Intact        Shoulder   Observation: Scar on Left lateral arm at Triceps Surae     Posture: Superiorly migrated humeral head     Passive Range of Motion:   Shoulder Right Left   Flexion NT NT   Abduction NT NT   ER at 90 NT NT   IR NT NT      Active Range of Motion:   Shoulder Right Left   Flexion 150 140    Abduction 145 135 *pain   ER at 90 90 90   IR 70 70      Strength:  Shoulder Right Left   Flexion 5/5 5/5   Abduction 5/5 5/5   ER 4+/5 3+/5   IR 5/5 4-/5   Serratus Anterior 4+/5 4-/5   Middle Trap NT NT   Low Trap NT NT         Special Tests:  Impingement Cluster:    Right Left   Hawkin's Kenndy negative positive   Painful Arc negative positive   Resisted ER negative positive                 Rotator Cuff Tear    Right Left   Painful Arc - +   Drop Arm test NT NT   Resisted ER - +      Other:    Right Left   Subscapularis Lift Off Test NT NT   Empty Can NT NT      Joint Mobility: Hypomobility L GH joint inferiorly and posteriorly     Palpation: TTP along Supraspinatus     Sensation: Intact    Objective Measures updated at progress report unless specified.     Treatment     Enid received the treatments listed below:      therapeutic exercises to develop strength, endurance, ROM, flexibility, posture, and core stabilization for 45 minutes including:  Aerodyne Bike Intervals 20''/10'' 6 rounds   Walking Lunges 2 laps   Step Up 12" Box 3 x 10 reps   Step Downs 6" Box 3 x 8 reps B Lower Extremities  Shuttle Squats 2 cords 30x's Single Leg   Knee Extension Matrix 35# 3 x 10 reps   Single Leg Ball Toss 30x's Right Lower Extremity   Lateral Walks with " Band Green 3 laps   Shoulder IR Red Band 30x's Genie  Shoulder ER Red Band 30x's Genie  Serratus Punch 30x's 25#     manual therapy techniques (billed as therex): Joint mobilizations were applied to the: Right Knee/Left Shoulder for 10 minutes, including:  Lateral Gapping Grade II  Flexion Gapping Grade III    Patient Education and Home Exercises       Education provided:   - Patient was provided education on for continued compliance with HEP for continued functional mobility and strength gains for return to prior level of function.      Written Home Exercises Provided: Patient instructed to cont prior HEP. Exercises were reviewed and Enid was able to demonstrate them prior to the end of the session.  Enid demonstrated good  understanding of the education provided. See Electronic Medical Record under Patient Instructions for exercises provided during therapy sessions    Assessment   Patient presents to the clinic with mild symptom irritability pre-session and no irritability post-session. Manual therapy was performed to Right knee and Left shoulder to improve joint play and allow for optimal movement during corrective exercises.   Patient demonstrates improving Right lateral knee pain with increased motor control of the Right Lower Extremity with hip strengthening exercises.   Patient demonstrated improvements in Right Lower Extremity motor control within session.     Patient will continue to benefit from skilled outpatient physical therapy to address the deficits listed in the problem list box on initial evaluation, provide patient/family education and to maximize patient's level of independence in the home and community environment.     Enid Is progressing well towards his goals.   Patient prognosis is Good.     Patient's spiritual, cultural and educational needs considered and pt agreeable to plan of care and goals.     Anticipated barriers to physical therapy: None    Goals:   Short Term Goals (4 weeks):  1.  Patient will have improved AROM of the right knee to 5-0-138 degrees to be able to perform functional squats.MET  2. Patient will have decreased complaints of pain to 0/10.MET  3. Patient will be independent and compliant with issued HEP. MET     Long Term Goals (8 weeks):   Patient will have improved AROM of the right knee to 5-0-140 degrees to be able to perform walking lunges. MET  2. Patient will have improved strength of the right knee to >95% of the Left Lower Extremity.  3. Patient will be able to resume prior functional level with no deficits.   4. Patient will have overall improvement in condition to have increased score on KOOS to 85% to show clinically important difference in patient perceived functional ability.  5. Pt will improve FOTO limitation score to less than or equal to 31% for improved self perception of functional performance with daily activities.     Plan   Plan of care Certification: 9/25/2024 to 12/31/2024.     Outpatient Physical Therapy 2 times weekly for 12 weeks to include the following interventions: Cervical/Lumbar Traction, Electrical Stimulation as needed, Gait Training, Manual Therapy, Moist Heat/ Ice, Neuromuscular Re-ed, Orthotic Management and Training, Patient Education, Self Care, Therapeutic Activities, and Therapeutic Exercise, Blood Flow Restriction Training, Trigger Point Dry Needling as needed.    Francisco Wu PT, DPT  Board Certified Orthopaedic Clinical Specialist

## 2024-10-24 ENCOUNTER — CLINICAL SUPPORT (OUTPATIENT)
Dept: REHABILITATION | Facility: OTHER | Age: 49
End: 2024-10-24
Payer: MEDICAID

## 2024-10-24 DIAGNOSIS — G89.29 CHRONIC PAIN OF RIGHT KNEE: Primary | ICD-10-CM

## 2024-10-24 DIAGNOSIS — M25.612 DECREASED RANGE OF MOTION OF LEFT SHOULDER: ICD-10-CM

## 2024-10-24 DIAGNOSIS — M25.661 DECREASED RANGE OF MOTION OF RIGHT KNEE: ICD-10-CM

## 2024-10-24 DIAGNOSIS — Z74.09 IMPAIRED MOBILITY AND ACTIVITIES OF DAILY LIVING: ICD-10-CM

## 2024-10-24 DIAGNOSIS — M25.512 ACUTE PAIN OF LEFT SHOULDER: ICD-10-CM

## 2024-10-24 DIAGNOSIS — M25.561 CHRONIC PAIN OF RIGHT KNEE: Primary | ICD-10-CM

## 2024-10-24 DIAGNOSIS — Z78.9 IMPAIRED MOBILITY AND ACTIVITIES OF DAILY LIVING: ICD-10-CM

## 2024-10-24 PROCEDURE — 97110 THERAPEUTIC EXERCISES: CPT | Mod: PN

## 2024-10-24 NOTE — PROGRESS NOTES
[facility]   Physical Therapy Treatment Note      Name: Enid Barreto  [facility] Number: [id number]    Therapy Diagnosis:   Encounter Diagnoses   Name Primary?    Chronic pain of right knee Yes    Decreased range of motion of right knee     Acute pain of left shoulder     Decreased range of motion of left shoulder     Impaired mobility and activities of daily living            Physician: Annie Schroeder MD    Visit Date: 10/24/2024    Physician Orders: PT Eval and Treat   Medical Diagnosis from Referral: Chronic left shoulder pain [M25.512, G89.29], Osteoarthritis of right knee, unspecified osteoarthritis type [M17.11]   Evaluation Date: 9/25/2024  Authorization Period Expiration: 9/4/2025  Plan of Care Expiration: 12/31/2024  Visit # / Visits authorized: 6 / 20  FOTO 1st follow up: 16% improvement   FOTO 2nd follow up:    PTA Visit #: 0/5     Precautions: Standard    Time In: 12:50 pm  Time Out: 1:45 pm  Total Appointment Time: 55 minutes    Subjective     Patient reports:  that he is doing alright.  He was compliant with home exercise program.  Response to previous treatment: improved mobility and pain   Functional change: too early    Pain: 2/10  Location: right knee      Objective    Asterisk Signs:   Knee:  Observation: Muscle Atrophy RLE>LLE quad/hamstring, Gastroc/soleus dominant bilaterally      Posture: resting in femoral adduction internal rotation     Functional tests:   SL Lunge: Difficult and painful on R at 90/90 compared to L   SLS EO: R trunk lean during R stance     Range of Motion (Passive):   Knee Right  Left    Flexion 140 140   Extension -3 -3      Range of Motion (Active):   Knee Right  Left    Flexion 135 135   Extension 0 0         Lower Extremity Strength  Right LE   Left LE     Quadriceps:  80# Quadriceps: 91#   Hamstrings: Medial: 22.2#  Lateral: 34.7# Hamstrings: Medial: 26.6#  Lateral: 40.3#   Hip extension:  4-/5 Hip extension: 4-/5   PGM: 22.2# PGM:   "48.3#      Special Tests:    Right Left   Valgus Stress Test - -   Varus Stress test - -   Lachman's test - -   Posterior Lachman - -   Norma's Test - -   Thessaly's Test - -   Patellar Grind Test - -      Joint Mobility: 2/6 patellofemoral joint and tibiofemoral joint      Palpation: TTP lateral patellofemoral joint     Sensation: Intact        Shoulder   Observation: Scar on Left lateral arm at Triceps Surae     Posture: Superiorly migrated humeral head     Passive Range of Motion:   Shoulder Right Left   Flexion NT NT   Abduction NT NT   ER at 90 NT NT   IR NT NT      Active Range of Motion:   Shoulder Right Left   Flexion 150 140    Abduction 145 135 *pain   ER at 90 90 90   IR 70 70      Strength:  Shoulder Right Left   Flexion 5/5 5/5   Abduction 5/5 5/5   ER 4+/5 3+/5   IR 5/5 4-/5   Serratus Anterior 4+/5 4-/5   Middle Trap NT NT   Low Trap NT NT         Special Tests:  Impingement Cluster:    Right Left   Hawkin's Kenndy negative positive   Painful Arc negative positive   Resisted ER negative positive                 Rotator Cuff Tear    Right Left   Painful Arc - +   Drop Arm test NT NT   Resisted ER - +      Other:    Right Left   Subscapularis Lift Off Test NT NT   Empty Can NT NT      Joint Mobility: Hypomobility L GH joint inferiorly and posteriorly     Palpation: TTP along Supraspinatus     Sensation: Intact    Objective Measures updated at progress report unless specified.     Treatment     Enid received the treatments listed below:      therapeutic exercises to develop strength, endurance, ROM, flexibility, posture, and core stabilization for 45 minutes including:  Aerodyne Bike Intervals 20''/10'' 6 rounds   Walking Lunges 2 laps   Step Up 12" Box 3 x 10 reps   Step Downs 6" Box 3 x 8 reps B Lower Extremities  Shuttle Squats 5 cords 4 x 5 reps Single Leg   Knee Extension Matrix 35# 3 x 10 reps   Single Leg Ball Toss 30x's Right Lower Extremity   Lateral Walks with Band Green 3 laps   Shoulder IR " Red Band 30x's Genie  Shoulder ER Red Band 30x's Genie  Serratus Punch 30x's 25#     manual therapy techniques (billed as therex): Joint mobilizations were applied to the: Right Knee/Left Shoulder for 10 minutes, including:  Lateral Gapping Grade II  Flexion Gapping Grade III    Patient Education and Home Exercises       Education provided:   - Patient was provided education on for continued compliance with HEP for continued functional mobility and strength gains for return to prior level of function.      Written Home Exercises Provided: Patient instructed to cont prior HEP. Exercises were reviewed and Enid was able to demonstrate them prior to the end of the session.  Enid demonstrated good  understanding of the education provided. See Electronic Medical Record under Patient Instructions for exercises provided during therapy sessions    Assessment   Patient presents to the clinic with mild symptom irritability pre-session and no irritability post-session. Manual therapy was performed to Right knee and Left shoulder to improve joint play and allow for optimal movement during corrective exercises.   Patient demonstrates improving Right lateral knee pain with increased motor control of the Right Lower Extremity with hip strengthening exercises.   Patient demonstrated improvements in Right Lower Extremity motor control within session.     Patient will continue to benefit from skilled outpatient physical therapy to address the deficits listed in the problem list box on initial evaluation, provide patient/family education and to maximize patient's level of independence in the home and community environment.     Enid Is progressing well towards his goals.   Patient prognosis is Good.     Patient's spiritual, cultural and educational needs considered and pt agreeable to plan of care and goals.     Anticipated barriers to physical therapy: None    Goals:   Short Term Goals (4 weeks):  1. Patient will have improved AROM of  the right knee to 5-0-138 degrees to be able to perform functional squats.MET  2. Patient will have decreased complaints of pain to 0/10.MET  3. Patient will be independent and compliant with issued HEP. MET     Long Term Goals (8 weeks):   Patient will have improved AROM of the right knee to 5-0-140 degrees to be able to perform walking lunges. MET  2. Patient will have improved strength of the right knee to >95% of the Left Lower Extremity.  3. Patient will be able to resume prior functional level with no deficits.   4. Patient will have overall improvement in condition to have increased score on KOOS to 85% to show clinically important difference in patient perceived functional ability.  5. Pt will improve FOTO limitation score to less than or equal to 31% for improved self perception of functional performance with daily activities.     Plan   Plan of care Certification: 9/25/2024 to 12/31/2024.     Outpatient Physical Therapy 2 times weekly for 12 weeks to include the following interventions: Cervical/Lumbar Traction, Electrical Stimulation as needed, Gait Training, Manual Therapy, Moist Heat/ Ice, Neuromuscular Re-ed, Orthotic Management and Training, Patient Education, Self Care, Therapeutic Activities, and Therapeutic Exercise, Blood Flow Restriction Training, Trigger Point Dry Needling as needed.    Francisco Wu PT, DPT  Board Certified Orthopaedic Clinical Specialist

## 2024-11-04 ENCOUNTER — CLINICAL SUPPORT (OUTPATIENT)
Dept: REHABILITATION | Facility: OTHER | Age: 49
End: 2024-11-04
Payer: MEDICAID

## 2024-11-04 DIAGNOSIS — G89.29 CHRONIC PAIN OF RIGHT KNEE: Primary | ICD-10-CM

## 2024-11-04 DIAGNOSIS — M25.512 ACUTE PAIN OF LEFT SHOULDER: ICD-10-CM

## 2024-11-04 DIAGNOSIS — Z74.09 IMPAIRED MOBILITY AND ACTIVITIES OF DAILY LIVING: ICD-10-CM

## 2024-11-04 DIAGNOSIS — M25.561 CHRONIC PAIN OF RIGHT KNEE: Primary | ICD-10-CM

## 2024-11-04 DIAGNOSIS — M25.612 DECREASED RANGE OF MOTION OF LEFT SHOULDER: ICD-10-CM

## 2024-11-04 DIAGNOSIS — Z78.9 IMPAIRED MOBILITY AND ACTIVITIES OF DAILY LIVING: ICD-10-CM

## 2024-11-04 DIAGNOSIS — M25.661 DECREASED RANGE OF MOTION OF RIGHT KNEE: ICD-10-CM

## 2024-11-04 PROCEDURE — 97110 THERAPEUTIC EXERCISES: CPT | Mod: PN

## 2024-11-04 NOTE — PROGRESS NOTES
[facility] OUTPATIENT THERAPY AND WELLNESS   Physical Therapy Treatment Note      Name: Enid Barreto  Clinic Number: [id number]    Therapy Diagnosis:   Encounter Diagnoses   Name Primary?    Chronic pain of right knee Yes    Decreased range of motion of right knee     Acute pain of left shoulder     Decreased range of motion of left shoulder     Impaired mobility and activities of daily living        Physician: Annie Schroeder MD    Visit Date: 11/4/2024    Physician Orders: PT Eval and Treat   Medical Diagnosis from Referral: Chronic left shoulder pain [M25.512, G89.29], Osteoarthritis of right knee, unspecified osteoarthritis type [M17.11]   Evaluation Date: 9/25/2024  Authorization Period Expiration: 9/4/2025  Plan of Care Expiration: 12/31/2024  Visit # / Visits authorized: 7 / 20  FOTO 1st follow up: 16% improvement   FOTO 2nd follow up:    PTA Visit #: 0/5     Precautions: Standard    Time In: 1:00 pm  Time Out: 1:55 pm  Total Appointment Time: 55 minutes    Subjective     Patient reports: injured his little toe last week.   He was compliant with home exercise program.  Response to previous treatment: improved mobility and pain   Functional change: too early    Pain: 2/10  Location: right knee      Objective    Asterisk Signs:   Knee:  Observation: Muscle Atrophy RLE>LLE quad/hamstring, Gastroc/soleus dominant bilaterally      Posture: resting in femoral adduction internal rotation     Functional tests:   SL Lunge: Difficult and painful on R at 90/90 compared to L   SLS EO: R trunk lean during R stance     Range of Motion (Passive):   Knee Right  Left    Flexion 140 140   Extension -3 -3      Range of Motion (Active):   Knee Right  Left    Flexion 135 135   Extension 0 0         Lower Extremity Strength  Right LE   Left LE     Quadriceps:  80# Quadriceps: 91#   Hamstrings: Medial: 22.2#  Lateral: 34.7# Hamstrings: Medial: 26.6#  Lateral: 40.3#   Hip extension:  4-/5 Hip extension: 4-/5   PGM: 22.2# PGM:   48.3#      Lower Extremity Strength 11/4/2024  Right LE   Left LE     Quadriceps:  117.8# Quadriceps: 99#   Hamstrings: Medial: 52#  Lateral: 48# Hamstrings: Medial: 53.4#  Lateral: 49.5#   Hip extension:  4/5 Hip extension: 4/5   PGM: 63# PGM:  59.2#     Special Tests:    Right Left   Valgus Stress Test - -   Varus Stress test - -   Lachman's test - -   Posterior Lachman - -   Norma's Test - -   Thessaly's Test - -   Patellar Grind Test - -      Joint Mobility: 3/6 patellofemoral joint and tibiofemoral joint      Palpation: TTP lateral patellofemoral joint     Sensation: Intact        Shoulder   Observation: Scar on Left lateral arm at Triceps Surae     Posture: Superiorly migrated humeral head    Active Range of Motion:   Shoulder Right Left   Flexion 150 140    Abduction 145 135 *pain   ER at 90 90 90   IR 70 70      Strength:  Shoulder Right Left   Flexion 5/5 5/5   Abduction 5/5 5/5   ER 4+/5 3+/5   IR 5/5 4-/5   Serratus Anterior 4+/5 4-/5   Middle Trap NT NT   Low Trap NT NT         Special Tests:  Impingement Cluster:    Right Left   Hawkin's Kenndy negative positive   Painful Arc negative positive   Resisted ER negative positive                 Rotator Cuff Tear    Right Left   Painful Arc - +   Drop Arm test NT NT   Resisted ER - +      Other:    Right Left   Subscapularis Lift Off Test NT NT   Empty Can NT NT      Joint Mobility: Hypomobility L GH joint inferiorly and posteriorly     Palpation: TTP along Supraspinatus     Sensation: Intact  Objective Measures updated at progress report unless specified.     Treatment     Enid received the treatments listed below:      therapeutic exercises to develop strength, endurance, ROM, flexibility, posture, and core stabilization for 45 minutes including:  Aerodyne Bike Intervals 20''/10'' 6 rounds   Walking Quad Pulls   Frankensteins   Side Steps   Scoops    Walking Marches    High Knees    Butt Kicks  Walking Lunges 2 laps   Shuttle Squats 5 cords 4 x 5 reps  Single Leg   Knee Extension Matrix 35# 3 x 10 reps   Lateral Walks with Band Green 3 laps     manual therapy techniques (billed as therex): Joint mobilizations were applied to the: Right Knee/Left Shoulder for 10 minutes, including:  Lateral Gapping Grade II  Flexion Gapping Grade III    Patient Education and Home Exercises       Education provided:   - Patient was provided education on for continued compliance with HEP for continued functional mobility and strength gains for return to prior level of function.      Written Home Exercises Provided: Patient instructed to cont prior HEP. Exercises were reviewed and Enid was able to demonstrate them prior to the end of the session.  Enid demonstrated good  understanding of the education provided. See Electronic Medical Record under Patient Instructions for exercises provided during therapy sessions    Assessment   Patient presents to the clinic with mild symptom irritability pre-session and no irritability post-session. Manual therapy was performed to Right knee and Left shoulder to improve joint play and allow for optimal movement during corrective exercises.   Patient demonstrates continued posterolateral knee pain with loaded knee flexion past 90 degrees which raises concern that patient has a posterior horn meniscus tear versus patellofemoral cartilage defect. Patient will get advanced imaging within 2 weeks and referral process to orthopedic team initiated.   Patient demonstrated improvements in Right Lower Extremity motor control within session.   Patient will continue to benefit from skilled outpatient physical therapy to address the deficits listed in the problem list box on initial evaluation, provide patient/family education and to maximize patient's level of independence in the home and community environment.     Enid Is progressing well towards his goals.   Patient prognosis is Good.     Patient's spiritual, cultural and educational needs considered and pt  home agreeable to plan of care and goals.     Anticipated barriers to physical therapy: None    Goals:   Short Term Goals (4 weeks):  1. Patient will have improved AROM of the right knee to 5-0-138 degrees to be able to perform functional squats.MET  2. Patient will have decreased complaints of pain to 0/10.MET  3. Patient will be independent and compliant with issued HEP. MET     Long Term Goals (8 weeks):   Patient will have improved AROM of the right knee to 5-0-140 degrees to be able to perform walking lunges. MET  2. Patient will have improved strength of the right knee to >95% of the Left Lower Extremity.  3. Patient will be able to resume prior functional level with no deficits.   4. Patient will have overall improvement in condition to have increased score on KOOS to 85% to show clinically important difference in patient perceived functional ability.  5. Pt will improve FOTO limitation score to less than or equal to 31% for improved self perception of functional performance with daily activities.     Plan   Plan of care Certification: 9/25/2024 to 12/31/2024.     Outpatient Physical Therapy 2 times weekly for 12 weeks to include the following interventions: Cervical/Lumbar Traction, Electrical Stimulation as needed, Gait Training, Manual Therapy, Moist Heat/ Ice, Neuromuscular Re-ed, Orthotic Management and Training, Patient Education, Self Care, Therapeutic Activities, and Therapeutic Exercise, Blood Flow Restriction Training, Trigger Point Dry Needling as needed.    Francisco Wu PT, DPT  Board Certified Orthopaedic Clinical Specialist

## 2024-11-05 ENCOUNTER — TELEPHONE (OUTPATIENT)
Dept: SPORTS MEDICINE | Facility: CLINIC | Age: 49
End: 2024-11-05
Payer: MEDICAID

## 2024-11-05 NOTE — TELEPHONE ENCOUNTER
Reached out to patient due to Francisco at PT giving him our name since he was interested in an appointment for his knee, which he is currently in PT for. Patient stated he has an appointment with an outside orthopedic doctor at the end of this month and is trying to figure out funds for that appointment and his MRI. Patient stated he appreciates our call but does not have funds right now for an appointment within [facility]. I told patient we are happy to see him it everything works out and he wants to be seen sooner than the end of this month. Patient stated Francisco spoke highly of us and that means something to him, so he really appreciated my call and will keep us on his radar for future reference. I told patient to please call us if he needs anything in the future. He understood.

## 2024-11-13 ENCOUNTER — DOCUMENTATION ONLY (OUTPATIENT)
Dept: REHABILITATION | Facility: OTHER | Age: 49
End: 2024-11-13
Payer: MEDICAID

## 2024-11-13 NOTE — PROGRESS NOTES
Date: 11/13/24  Patient ID: [id number]  Patient was a no show for today's scheduled appointment. PT contacted patient via number listed in chart to discuss attendance. Patient did not answer and LVM to call back.     Francisco Wu PT, DPT  Board Certified Orthopaedic Clinical Specialist   [facility]  [address] [place] [id number]   Phone: [phone]  Email: [email or web address]

## 2024-11-25 DIAGNOSIS — M23.41 LOOSE BODY OF RIGHT KNEE: Primary | ICD-10-CM

## 2024-11-25 DIAGNOSIS — M84.369A STRESS FRACTURE OF KNEE: ICD-10-CM

## 2025-08-19 DIAGNOSIS — G89.29 CHRONIC PAIN OF RIGHT KNEE: Primary | ICD-10-CM

## 2025-08-19 DIAGNOSIS — M25.561 CHRONIC PAIN OF RIGHT KNEE: Primary | ICD-10-CM

## 2025-08-19 DIAGNOSIS — M17.11 PRIMARY OSTEOARTHRITIS OF RIGHT KNEE: ICD-10-CM
